# Patient Record
Sex: MALE | Race: WHITE | URBAN - METROPOLITAN AREA
[De-identification: names, ages, dates, MRNs, and addresses within clinical notes are randomized per-mention and may not be internally consistent; named-entity substitution may affect disease eponyms.]

---

## 2020-02-17 ENCOUNTER — EMERGENCY (EMERGENCY)
Facility: HOSPITAL | Age: 20
LOS: 0 days | Discharge: HOME | End: 2020-02-17
Attending: EMERGENCY MEDICINE | Admitting: EMERGENCY MEDICINE
Payer: MEDICAID

## 2020-02-17 VITALS
TEMPERATURE: 97 F | DIASTOLIC BLOOD PRESSURE: 83 MMHG | WEIGHT: 218.26 LBS | RESPIRATION RATE: 20 BRPM | SYSTOLIC BLOOD PRESSURE: 137 MMHG | HEART RATE: 78 BPM | OXYGEN SATURATION: 100 %

## 2020-02-17 DIAGNOSIS — R21 RASH AND OTHER NONSPECIFIC SKIN ERUPTION: ICD-10-CM

## 2020-02-17 DIAGNOSIS — L29.9 PRURITUS, UNSPECIFIED: ICD-10-CM

## 2020-02-17 DIAGNOSIS — Z88.0 ALLERGY STATUS TO PENICILLIN: ICD-10-CM

## 2020-02-17 PROCEDURE — 99283 EMERGENCY DEPT VISIT LOW MDM: CPT

## 2020-02-17 RX ORDER — PERMETHRIN CREAM 5% W/W 50 MG/G
1 CREAM TOPICAL
Qty: 1 | Refills: 0
Start: 2020-02-17 | End: 2020-03-01

## 2020-02-17 NOTE — ED PROVIDER NOTE - NS ED ROS FT
General: No fever, chills, or weakness.  Eyes:  No visual changes, eye pain or discharge.  ENMT:  No hearing changes, pain, no sore throat or runny nose, no difficulty swallowing  Cardiac:  No chest pain, SOB or edema. No chest pain with exertion.  Respiratory:  No cough or respiratory distress. No hemoptysis. No history of asthma or RAD.  GI:  No nausea, vomiting, diarrhea or abdominal pain.  :  No dysuria, frequency or burning.  Skin: Diffuse pruritic erythematous rash. No discharge or bleeding.

## 2020-02-17 NOTE — ED PROVIDER NOTE - PATIENT PORTAL LINK FT
You can access the FollowMyHealth Patient Portal offered by [facility] by registering at the following website: [email or web address]. By joining [employer]’s FollowMyHealth portal, you will also be able to view your health information using other applications (apps) compatible with our system.

## 2020-02-17 NOTE — ED PROVIDER NOTE - PROGRESS NOTE DETAILS
19M no PMH p/w rash diffusely on body. Started tuesday, pruritic, red lesions on right hand. By saturday it spread to rest of body on trunk, back, legs, around anus. No lesions on penis. previously sexually active w/ female 1 month ago, nothing since. never had gu lesions. no dysuria, joint pains. given permethrin at [facility] [place]. Will dc. Return precautions -- fever, lethargy. f/u PMD in 48-72 hours. 19M no PMH p/w rash diffusely on body. Started tuesday, pruritic, red lesions on right hand. By saturday it spread to rest of body on trunk, back, legs, around anus. No lesions on penis. previously sexually active w/ female 1 month ago, nothing since. never had gu lesions. no dysuria, joint pains. [place] [facility] Rash consistent with scabies. Will dc with permethrin cream. Return precautions -- fever, lethargy. f/u PMD in 48-72 hours.

## 2020-02-17 NOTE — ED PROVIDER NOTE - CLINICAL SUMMARY MEDICAL DECISION MAKING FREE TEXT BOX
scabies, incorrect 1st application of permethrin, mild hive-like reaction surrounding bites/burrows - instructed on correct use of permethrin and rec repeat in 10d if rash still persistent, rec benadryl for itching, return precautions discussed, refill given, op Derm f/u if no improvement

## 2020-02-17 NOTE — ED PROVIDER NOTE - ADDITIONAL NOTES AND INSTRUCTIONS:
Return to work after being cleared by your primary doctor. Please follow up with your primary doctor in 48 hours to follow up resolution of the rash.

## 2020-02-17 NOTE — ED PROVIDER NOTE - ATTENDING CONTRIBUTION TO CARE
19y m p/w rash x few days. 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 No other sx. Went to outside [facility] [place] [place] yest, dx with scabies, given permethrin cream which he used overnight but when he washed it off states rash worsened - dvlped some surrounding erythema and incr itchiness, pt's mom encouraged him to come to ED for re-evaluation. Pt rpts applying permethrin to whole body however used a very sm amt bc he thought he was supposed to use if every night for a week (misunderstood  instruction, which states to repeat application in 10-14 days if rash still present). No other sx. No f/c, uri sx, cough, sob, nvd, abd pain.    PE:  nad  skin- scattered papular rash with confluence to neck, wrists, waistline, distal LEs bl, occ surrounding erythema/hive-like erythema, no cellulitis/abscesses  ncat  neck supple  rrr nl s1s2 no mrg  ctab no wrr  abd soft ntnd no palpable masses no rgr  back non-tender no cvat  ext no cce dpi  neuro aaox3 grossly nf exam

## 2020-02-17 NOTE — ED PROVIDER NOTE - PHYSICAL EXAMINATION
Constitutional: Well developed, well nourished. NAD. Good general hygiene  Head: Atraumatic.  Eyes: PERRLA. EOMI without discomfort.   ENT: No nasal discharge. Mucous membranes moist. No oral lesions.  Cardiovascular: Regular rhythm. Regular rate. Normal S1 and S2. No murmurs. 2+ pulses in all extremities.   Pulmonary: Normal respiratory rate and effort. Lungs clear to auscultation bilaterally. No wheezing, rales, or rhonchi. Bilateral, equal lung expansion.   Abdominal: Soft. Nondistended. Nontender. No rebound or guarding.   Extremities. Pelvis stable. No lower extremity edema. Symmetric calves.  Skin: Multiple discrete pinpoint erythematous lesions on skin on hands, arms, trunk, groin. No vesicles, plaques, pustules, induration, fluctuance. No discharge or bleeding.  Neuro: AAOx3. No focal neurological deficits.  Psych: Normal mood. Normal affect.

## 2020-02-17 NOTE — ED PEDIATRIC TRIAGE NOTE - CHIEF COMPLAINT QUOTE
c/o rash all over body x few days. Patient is a  at a school. Sent in by urgent care to r/o scabies.

## 2020-02-17 NOTE — ED PROVIDER NOTE - CARE PROVIDER_API CALL
Billy Constantino)  Dermatology; Internal Medicine  [address] [place] [address]  Phone: [phone]  Fax: [phone]  Follow Up Time: 1-3 Days

## 2020-02-17 NOTE — ED PROVIDER NOTE - OBJECTIVE STATEMENT
19M no PMH p/w rash diffuse on body. Started last tuesday on right hand, then progressively throughout the week spread to the rest of his body. Rash is pruritic, erythematous, with no assoc discharge, no fever, no cough, no vomiting/diarrhea. Pt went to [facility] [place] 2 days ago and received permethrin cream. Pt applied small amount briefly and immediately washed it off with no improvement in sxs.

## 2020-03-11 ENCOUNTER — TRANSCRIPTION ENCOUNTER (OUTPATIENT)
Age: 20
End: 2020-03-11

## 2020-09-30 ENCOUNTER — TRANSCRIPTION ENCOUNTER (OUTPATIENT)
Age: 20
End: 2020-09-30

## 2021-04-06 PROBLEM — Z00.00 ENCOUNTER FOR PREVENTIVE HEALTH EXAMINATION: Status: ACTIVE | Noted: 2021-04-06

## 2021-04-09 ENCOUNTER — APPOINTMENT (OUTPATIENT)
Dept: CARDIOLOGY | Facility: CLINIC | Age: 21
End: 2021-04-09
Payer: COMMERCIAL

## 2021-04-09 VITALS
TEMPERATURE: 98.4 F | WEIGHT: 225 LBS | HEIGHT: 72 IN | DIASTOLIC BLOOD PRESSURE: 80 MMHG | HEART RATE: 77 BPM | SYSTOLIC BLOOD PRESSURE: 136 MMHG | BODY MASS INDEX: 30.48 KG/M2

## 2021-04-09 PROCEDURE — 99203 OFFICE O/P NEW LOW 30 MIN: CPT

## 2021-04-09 PROCEDURE — 99072 ADDL SUPL MATRL&STAF TM PHE: CPT

## 2021-04-09 PROCEDURE — 93000 ELECTROCARDIOGRAM COMPLETE: CPT

## 2021-04-20 NOTE — DISCUSSION/SUMMARY
[FreeTextEntry1] : Stress ECHO to evaluate for structural heart disease and exercise induces ectopy / ischemia.\par MCOT if symptoms persist.\par Follow-up 6-weeks.

## 2021-04-20 NOTE — HISTORY OF PRESENT ILLNESS
[FreeTextEntry1] : 20 year-old male presents for cardiac evaluation.\par \par No cardiac history.\par No clear risk factors.\par \par COVID 19 infection (1/2021).  Mild course.\par \par New palpitations.  Generally random.  Sometimes associated with stress / no other clear precipitants.  Generally brief.  No associated symptoms.  No lightheadedness / syncope.\par \par Chest discomfort variably related to meals.  Not exertional.\par \par Physical work ().  Occasional mild exertional dyspnea.  Breathing otherwise comfortable.
Patient informed/Family informed

## 2021-04-20 NOTE — ASSESSMENT
[FreeTextEntry1] : Young male with palpitations, atypical chest pain, and mild exertional dyspnea post COVID.

## 2021-05-11 ENCOUNTER — APPOINTMENT (OUTPATIENT)
Dept: CARDIOLOGY | Facility: CLINIC | Age: 21
End: 2021-05-11
Payer: COMMERCIAL

## 2021-05-11 ENCOUNTER — APPOINTMENT (OUTPATIENT)
Dept: CARDIOLOGY | Facility: CLINIC | Age: 21
End: 2021-05-11

## 2021-05-11 PROCEDURE — 93015 CV STRESS TEST SUPVJ I&R: CPT

## 2021-05-11 PROCEDURE — 93306 TTE W/DOPPLER COMPLETE: CPT

## 2021-05-11 PROCEDURE — 99072 ADDL SUPL MATRL&STAF TM PHE: CPT

## 2023-02-26 ENCOUNTER — INPATIENT (INPATIENT)
Facility: HOSPITAL | Age: 23
LOS: 1 days | Discharge: HOME | DRG: 916 | End: 2023-02-28
Attending: HOSPITALIST | Admitting: HOSPITALIST
Payer: COMMERCIAL

## 2023-02-26 VITALS
RESPIRATION RATE: 22 BRPM | OXYGEN SATURATION: 97 % | DIASTOLIC BLOOD PRESSURE: 72 MMHG | WEIGHT: 235.01 LBS | HEIGHT: 73 IN | HEART RATE: 115 BPM | SYSTOLIC BLOOD PRESSURE: 136 MMHG | TEMPERATURE: 101 F

## 2023-02-26 DIAGNOSIS — T50.905A ADVERSE EFFECT OF UNSPECIFIED DRUGS, MEDICAMENTS AND BIOLOGICAL SUBSTANCES, INITIAL ENCOUNTER: ICD-10-CM

## 2023-02-26 LAB
ALBUMIN SERPL ELPH-MCNC: 4.8 G/DL — SIGNIFICANT CHANGE UP (ref 3.5–5.2)
ALP SERPL-CCNC: 77 U/L — SIGNIFICANT CHANGE UP (ref 30–115)
ALT FLD-CCNC: 23 U/L — SIGNIFICANT CHANGE UP (ref 0–41)
ANION GAP SERPL CALC-SCNC: 13 MMOL/L — SIGNIFICANT CHANGE UP (ref 7–14)
APTT BLD: 33.9 SEC — SIGNIFICANT CHANGE UP (ref 27–39.2)
AST SERPL-CCNC: 20 U/L — SIGNIFICANT CHANGE UP (ref 0–41)
BASE EXCESS BLDV CALC-SCNC: 5.2 MMOL/L — HIGH (ref -2–3)
BASOPHILS # BLD AUTO: 0.01 K/UL — SIGNIFICANT CHANGE UP (ref 0–0.2)
BASOPHILS NFR BLD AUTO: 0.2 % — SIGNIFICANT CHANGE UP (ref 0–1)
BILIRUB SERPL-MCNC: 0.4 MG/DL — SIGNIFICANT CHANGE UP (ref 0.2–1.2)
BUN SERPL-MCNC: 9 MG/DL — LOW (ref 10–20)
CA-I SERPL-SCNC: 1.23 MMOL/L — SIGNIFICANT CHANGE UP (ref 1.15–1.33)
CALCIUM SERPL-MCNC: 9.7 MG/DL — SIGNIFICANT CHANGE UP (ref 8.4–10.4)
CHLORIDE SERPL-SCNC: 98 MMOL/L — SIGNIFICANT CHANGE UP (ref 98–110)
CO2 SERPL-SCNC: 25 MMOL/L — SIGNIFICANT CHANGE UP (ref 17–32)
CREAT SERPL-MCNC: 1.2 MG/DL — SIGNIFICANT CHANGE UP (ref 0.7–1.5)
EGFR: 87 ML/MIN/1.73M2 — SIGNIFICANT CHANGE UP
EOSINOPHIL # BLD AUTO: 0.11 K/UL — SIGNIFICANT CHANGE UP (ref 0–0.7)
EOSINOPHIL NFR BLD AUTO: 1.8 % — SIGNIFICANT CHANGE UP (ref 0–8)
GAS PNL BLDV: 133 MMOL/L — LOW (ref 136–145)
GAS PNL BLDV: SIGNIFICANT CHANGE UP
GLUCOSE SERPL-MCNC: 95 MG/DL — SIGNIFICANT CHANGE UP (ref 70–99)
HCO3 BLDV-SCNC: 32 MMOL/L — HIGH (ref 22–29)
HCT VFR BLD CALC: 45.7 % — SIGNIFICANT CHANGE UP (ref 42–52)
HCT VFR BLDA CALC: 49 % — SIGNIFICANT CHANGE UP (ref 39–51)
HGB BLD CALC-MCNC: 16.4 G/DL — SIGNIFICANT CHANGE UP (ref 12.6–17.4)
HGB BLD-MCNC: 16 G/DL — SIGNIFICANT CHANGE UP (ref 14–18)
IMM GRANULOCYTES NFR BLD AUTO: 0.5 % — HIGH (ref 0.1–0.3)
INR BLD: 1.08 RATIO — SIGNIFICANT CHANGE UP (ref 0.65–1.3)
LACTATE BLDV-MCNC: 1.6 MMOL/L — SIGNIFICANT CHANGE UP (ref 0.5–2)
LACTATE SERPL-SCNC: 1.3 MMOL/L — SIGNIFICANT CHANGE UP (ref 0.7–2)
LYMPHOCYTES # BLD AUTO: 0.4 K/UL — LOW (ref 1.2–3.4)
LYMPHOCYTES # BLD AUTO: 6.6 % — LOW (ref 20.5–51.1)
MCHC RBC-ENTMCNC: 30 PG — SIGNIFICANT CHANGE UP (ref 27–31)
MCHC RBC-ENTMCNC: 35 G/DL — SIGNIFICANT CHANGE UP (ref 32–37)
MCV RBC AUTO: 85.7 FL — SIGNIFICANT CHANGE UP (ref 80–94)
MONOCYTES # BLD AUTO: 0.38 K/UL — SIGNIFICANT CHANGE UP (ref 0.1–0.6)
MONOCYTES NFR BLD AUTO: 6.3 % — SIGNIFICANT CHANGE UP (ref 1.7–9.3)
NEUTROPHILS # BLD AUTO: 5.14 K/UL — SIGNIFICANT CHANGE UP (ref 1.4–6.5)
NEUTROPHILS NFR BLD AUTO: 84.6 % — HIGH (ref 42.2–75.2)
NRBC # BLD: 0 /100 WBCS — SIGNIFICANT CHANGE UP (ref 0–0)
PCO2 BLDV: 51 MMHG — SIGNIFICANT CHANGE UP (ref 42–55)
PH BLDV: 7.4 — SIGNIFICANT CHANGE UP (ref 7.32–7.43)
PLATELET # BLD AUTO: 177 K/UL — SIGNIFICANT CHANGE UP (ref 130–400)
PO2 BLDV: 19 MMHG — SIGNIFICANT CHANGE UP
POTASSIUM BLDV-SCNC: 4.7 MMOL/L — SIGNIFICANT CHANGE UP (ref 3.5–5.1)
POTASSIUM SERPL-MCNC: 4.7 MMOL/L — SIGNIFICANT CHANGE UP (ref 3.5–5)
POTASSIUM SERPL-SCNC: 4.7 MMOL/L — SIGNIFICANT CHANGE UP (ref 3.5–5)
PROT SERPL-MCNC: 7.2 G/DL — SIGNIFICANT CHANGE UP (ref 6–8)
PROTHROM AB SERPL-ACNC: 12.4 SEC — SIGNIFICANT CHANGE UP (ref 9.95–12.87)
RBC # BLD: 5.33 M/UL — SIGNIFICANT CHANGE UP (ref 4.7–6.1)
RBC # FLD: 11.9 % — SIGNIFICANT CHANGE UP (ref 11.5–14.5)
SAO2 % BLDV: 24.1 % — SIGNIFICANT CHANGE UP
SODIUM SERPL-SCNC: 136 MMOL/L — SIGNIFICANT CHANGE UP (ref 135–146)
WBC # BLD: 6.07 K/UL — SIGNIFICANT CHANGE UP (ref 4.8–10.8)
WBC # FLD AUTO: 6.07 K/UL — SIGNIFICANT CHANGE UP (ref 4.8–10.8)

## 2023-02-26 PROCEDURE — G0378: CPT

## 2023-02-26 PROCEDURE — 99291 CRITICAL CARE FIRST HOUR: CPT

## 2023-02-26 PROCEDURE — 83735 ASSAY OF MAGNESIUM: CPT

## 2023-02-26 PROCEDURE — 81003 URINALYSIS AUTO W/O SCOPE: CPT

## 2023-02-26 PROCEDURE — 85025 COMPLETE CBC W/AUTO DIFF WBC: CPT

## 2023-02-26 PROCEDURE — 84145 PROCALCITONIN (PCT): CPT

## 2023-02-26 PROCEDURE — 86160 COMPLEMENT ANTIGEN: CPT

## 2023-02-26 PROCEDURE — 80061 LIPID PANEL: CPT

## 2023-02-26 PROCEDURE — 71045 X-RAY EXAM CHEST 1 VIEW: CPT | Mod: 26

## 2023-02-26 PROCEDURE — 99221 1ST HOSP IP/OBS SF/LOW 40: CPT

## 2023-02-26 PROCEDURE — 83036 HEMOGLOBIN GLYCOSYLATED A1C: CPT

## 2023-02-26 PROCEDURE — 36415 COLL VENOUS BLD VENIPUNCTURE: CPT

## 2023-02-26 PROCEDURE — 99223 1ST HOSP IP/OBS HIGH 75: CPT

## 2023-02-26 PROCEDURE — 87086 URINE CULTURE/COLONY COUNT: CPT

## 2023-02-26 PROCEDURE — 86140 C-REACTIVE PROTEIN: CPT

## 2023-02-26 PROCEDURE — 80053 COMPREHEN METABOLIC PANEL: CPT

## 2023-02-26 PROCEDURE — 85652 RBC SED RATE AUTOMATED: CPT

## 2023-02-26 PROCEDURE — 0241U: CPT

## 2023-02-26 PROCEDURE — 93010 ELECTROCARDIOGRAM REPORT: CPT

## 2023-02-26 RX ORDER — LANOLIN ALCOHOL/MO/W.PET/CERES
3 CREAM (GRAM) TOPICAL AT BEDTIME
Refills: 0 | Status: DISCONTINUED | OUTPATIENT
Start: 2023-02-26 | End: 2023-02-28

## 2023-02-26 RX ORDER — DIPHENHYDRAMINE HCL 50 MG
50 CAPSULE ORAL ONCE
Refills: 0 | Status: COMPLETED | OUTPATIENT
Start: 2023-02-26 | End: 2023-02-26

## 2023-02-26 RX ORDER — ACETAMINOPHEN 500 MG
975 TABLET ORAL ONCE
Refills: 0 | Status: COMPLETED | OUTPATIENT
Start: 2023-02-26 | End: 2023-02-26

## 2023-02-26 RX ORDER — FAMOTIDINE 10 MG/ML
20 INJECTION INTRAVENOUS
Refills: 0 | Status: DISCONTINUED | OUTPATIENT
Start: 2023-02-26 | End: 2023-02-28

## 2023-02-26 RX ORDER — SODIUM CHLORIDE 9 MG/ML
3300 INJECTION, SOLUTION INTRAVENOUS ONCE
Refills: 0 | Status: COMPLETED | OUTPATIENT
Start: 2023-02-26 | End: 2023-02-26

## 2023-02-26 RX ORDER — IPRATROPIUM/ALBUTEROL SULFATE 18-103MCG
3 AEROSOL WITH ADAPTER (GRAM) INHALATION ONCE
Refills: 0 | Status: COMPLETED | OUTPATIENT
Start: 2023-02-26 | End: 2023-02-26

## 2023-02-26 RX ORDER — DIPHENHYDRAMINE HCL 50 MG
50 CAPSULE ORAL EVERY 8 HOURS
Refills: 0 | Status: DISCONTINUED | OUTPATIENT
Start: 2023-02-26 | End: 2023-02-28

## 2023-02-26 RX ORDER — ACETAMINOPHEN 500 MG
650 TABLET ORAL EVERY 6 HOURS
Refills: 0 | Status: DISCONTINUED | OUTPATIENT
Start: 2023-02-26 | End: 2023-02-28

## 2023-02-26 RX ADMIN — SODIUM CHLORIDE 3300 MILLILITER(S): 9 INJECTION, SOLUTION INTRAVENOUS at 16:28

## 2023-02-26 RX ADMIN — Medication 125 MILLIGRAM(S): at 16:29

## 2023-02-26 RX ADMIN — Medication 50 MILLIGRAM(S): at 21:59

## 2023-02-26 RX ADMIN — Medication 975 MILLIGRAM(S): at 16:40

## 2023-02-26 RX ADMIN — Medication 3 MILLILITER(S): at 16:29

## 2023-02-26 RX ADMIN — Medication 102 MILLIGRAM(S): at 16:29

## 2023-02-26 NOTE — ED ADULT TRIAGE NOTE - CHIEF COMPLAINT QUOTE
pt came to ED for throat pain, tongue swelling, lip swelling, and rash to upper torso area. pt reports an allergy to penicillin, has been taking Bactrim for a "throat infection". finished the course of PO ABX yesterday. pt reports difficulty breathing and chest tightness

## 2023-02-26 NOTE — H&P ADULT - ASSESSMENT
22 year old man with chronic sinusitis is presenting for acute allergic reaction, presumably to bactrim    #Allergic Reaction  #Angioedema, Resolved  - Patient given solumedrol 125 mg, benadryl 50 mg once and duoneb  - Will continue on methylprednisone 60 mg daily and benadryl 50 mg every 8 hrs  - CRP, ESR, C4 level ordered  - C1 esterase inh level ordered (less likely diagnosis as patient has associated rash)  - Patient told to immediately call the nurse in case he feels that his throat is closing down, if he has SOB or difficulty breathy, or if he starts wheezing or getting dizzy  - Patient advised not to take bactrim any more  - Patient has allergist appt in april  - c/s allergist    #Fever  - Maybe related to uriticaria  - No urinary symptoms, no cough, SOB has resolved, no diarrhea, no URI  - F/u blood cx and UA is still pending      Diet: Regular  Activity: as tolerated  GI ppx; none  DVT ppx: none  Dispo: MED/SURG

## 2023-02-26 NOTE — ED PROVIDER NOTE - PHYSICAL EXAMINATION
CONSTITUTIONAL: well-appearing, in NAD  SKIN: Warm dry; blanching rash on trunk that spares palms/soles; crackling around lips  HEAD: NCAT  EYES: EOMI, PERRLA, no scleral icterus, conjunctiva pink; +conjunctivitis  ENT: normal pharynx with no erythema or exudates; posterior oropharynx erythematous  NECK: Supple; non tender. Full ROM.  CARD: RRR, no murmurs.  RESP: clear to ausculation b/l. No crackles or wheezing.  ABD: soft, non-tender, non-distended, no rebound or guarding.  EXT: Full ROM, no bony tenderness, no pedal edema, no calf tenderness  NEURO: normal motor. normal sensory. Normal gait.  PSYCH: Cooperative, appropriate.

## 2023-02-26 NOTE — ED PROVIDER NOTE - ATTENDING CONTRIBUTION TO CARE
22-year-old male with history of recurrent sinusitis presenting today with rash.  States that he was treated with Bactrim for presumed sinusitis, today was day 10 of antibiotics, woke up this morning with nausea, lip swelling, chest tightness, nonpruritic rash to the extremities.  Does have an allergic reaction to penicillin where he gets vesicles however has never had a reaction to Bactrim.  Denies any vomiting or diarrhea patient noted to be febrile in the ED.  Speaking full sentences, maintaining airway, tolerating secretions.   Patient states that his lip swelling and facial swelling has much improved.    Patient noted to be tachycardic, febrile.  Otherwise stable vital signs.  Noted to have erythematous, crusting of bilateral lips, erythema of the roof of mouth as well as vesicles to the posterior oropharynx.  Noted to have conjunctivitis of eyes.  Blanching macular rash.  Lungs clear to station bilaterally.  Abdomen soft nontender nondistended.  No rash to palms and soles.  Uvula is nonedematous, midline.    Concern for Melara-Kevon syndrome at this time, burn consulted.  Plan for labs, chest x-ray.  Will treat with IV steroids at this time, Benadryl for possible allergic reaction although at this time less likely given fever.

## 2023-02-26 NOTE — H&P ADULT - HISTORY OF PRESENT ILLNESS
22-year-old male with history of recurrent sinusitis presenting today with rash, chest tightness and SOB. Patient says that he felt that his throat was closing up, had itchiness in the roof of his mouth and his tongue and eyes were swollen. Patient states that he was treated with Bactrim for presumed sinusitis, and today was day 10 of antibiotics, woke up this morning with nausea, lip swelling, chest tightness, nonpruritic rash to the extremities.  Does have an allergic reaction to penicillin where he gets vesicles however has never had a reaction to Bactrim.  Denies any vomiting or diarrhea patient noted to be febrile in the ED.  Speaking full sentences, maintaining airway, tolerating secretions.   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 The patient is fully vaccinated since childhood. No other new meds, or foods. No recent travel.  Patient states that his lip swelling and facial swelling has much improved but the eruption and fever happened only today in the ED.  In the ED, patient was given one dose of solumedrol and bendaryl. Lab work unremarkable. UA is pending.

## 2023-02-26 NOTE — ED PROVIDER NOTE - OBJECTIVE STATEMENT
23 yo M with no PMH presenting for swollen eyes, lips, and difficulty breathing that started this morning upon waking. Patient states he saw ENT 10 days ago and was told he had a throat infection and was started on bactrim, which he has been taking. 23 yo M with no PMH presenting for swollen eyes, lips, and difficulty breathing that started this morning upon waking. Patient states he saw ENT 10 days ago and was told he had a throat infection and was started on bactrim, which he has been taking. No other new foods/medications/clothing, travel, sick contacts, headache, vision changes, dizziness, CP, SOB, NVD, dysuria, hematuria, melena, hematochezia, drug/alcohol use.

## 2023-02-26 NOTE — H&P ADULT - NSHPPHYSICALEXAM_GEN_ALL_CORE
GA; alert oriented x3  HEENT: tongue minimally swollen, uvula midline, diffuse maculopapular rash all over his face, eyes not swollen  Heart: normal s1 s2  Lungs: clear , no wheezing  Abdomen: soft, non tender, Rash on torso and back  LE: no edema, no rash on both LE

## 2023-02-26 NOTE — ED ADULT NURSE NOTE - OBJECTIVE STATEMENT
Presented to ED with generalized rash, warmth, facial swelling without airway compromise. Speaking in full sentences, no difficulty breathing. As per pt, he was taking Bactrim for throat infection x 10 days, only known drug allergy is penicillin.

## 2023-02-26 NOTE — H&P ADULT - NSHPLABSRESULTS_GEN_ALL_CORE
Complete Blood Count + Automated Diff (02.26.23 @ 15:58)   WBC Count: 6.07 K/uL   RBC Count: 5.33 M/uL   Hemoglobin: 16.0 g/dL   Hematocrit: 45.7 %   Mean Cell Volume: 85.7 fL   Mean Cell Hemoglobin: 30.0 pg   Mean Cell Hemoglobin Conc: 35.0 g/dL   Red Cell Distrib Width: 11.9 %   Platelet Count - Automated: 177 K/uL   Auto Neutrophil #: 5.14 K/uL   Auto Lymphocyte #: 0.40 K/uL   Auto Monocyte #: 0.38 K/uL   Auto Eosinophil #: 0.11 K/uL   Auto Basophil #: 0.01 K/uL   Auto Neutrophil %: 84.6: Differential percentages must be correlated with absolute numbers for   clinical significance. %   Auto Lymphocyte %: 6.6 %   Auto Monocyte %: 6.3 %   Auto Eosinophil %: 1.8 %   Auto Basophil %: 0.2 %   Auto Immature Granulocyte %: 0.5: (Includes meta, myelo and promyelocytes). Mild elevations in immature   granulocytes may be seen with many inflammatory processes and pregnancy;   clinical correlation suggested. %   Nucleated RBC: 0 /100 WBCs Comprehensive Metabolic Panel (02.26.23 @ 15:58)   Sodium, Serum: 136 mmol/L   Potassium, Serum: 4.7 mmol/L   Chloride, Serum: 98 mmol/L   Carbon Dioxide, Serum: 25 mmol/L   Anion Gap, Serum: 13 mmol/L   Blood Urea Nitrogen, Serum: 9 mg/dL   Creatinine, Serum: 1.2 mg/dL   Glucose, Serum: 95 mg/dL   Calcium, Total Serum: 9.7 mg/dL   Protein Total, Serum: 7.2 g/dL   Albumin, Serum: 4.8 g/dL   Bilirubin Total, Serum: 0.4 mg/dL   Alkaline Phosphatase, Serum: 77 U/L   Aspartate Aminotransferase (AST/SGOT): 20 U/L   Alanine Aminotransferase (ALT/SGPT): 23 U/L   eGFR: 87: The estimated glomerular filtration rate (eGFR) is calculated using the   2021 CKD-EPI creatinine equation, which does not have a coefficient for   race and is validated in individuals 18 years of age and older (N Engl J   Med 2021; 385:7609-9889). Creatinine-based eGFR may be inaccurate in   various situations including but not limited to extremes of muscle mass,   altered dietary protein intake, or medications that affect renal tubular   creatinine secretion. mL/min/1.73m2

## 2023-02-26 NOTE — H&P ADULT - ATTENDING COMMENTS
Patient seen and examined at bedside independently of the residents. I read the resident's note and agree with the plan with the additions and corrections as noted below. My note supersedes the resident's note.     REVIEW OF SYSTEMS:  CONSTITUTIONAL: No weakness, fevers or chills  EYES/ENT: No visual changes;  No vertigo or throat pain. Lip swelling. Throat swelling.   NECK: No pain or stiffness.   RESPIRATORY: No cough, wheezing, hemoptysis; No shortness of breath  CARDIOVASCULAR: No chest pain or palpitations  GASTROINTESTINAL: No abdominal or epigastric pain. No nausea, vomiting, or hematemesis; No diarrhea or constipation. No melena or hematochezia.  GENITOURINARY: No dysuria, frequency or hematuria  NEUROLOGICAL: No numbness or weakness  MSK: No pain. No weakness.   SKIN: No itching, rashes.     PMH: recurrent sinusitis    FHx: Reviewed. No fhx of asthma/copd, No fhx of liver and pulmonary disease. No fhx of hematological disorder.     Physical Exam:  GEN: No acute distress. Awake, Alert and oriented x 3.   Head: Atraumatic, Normocephalic.   Eye: PEERLA. No sclera icterus. EOMI.   ENT: Normal oropharynx, no thyromegaly, no mass, no lymphadenopathy.   LUNGS: Clear to auscultation bilaterally. No wheeze/rales/crackles.   HEART: Normal. S1/S2 present. RRR. No murmur/gallops.   ABD: Soft, non-tender, non-distended. Bowel sounds present.   EXT: No pitting edema. No erythema. No tenderness.  Integumentary:  No sore, No petechia. + urticaria. + blanching rashes on upper chest.   NEURO: CN III-XII intact. Strength: 5/5 b/l ULE. Sensory intact b/l ULE.     Vital Signs Last 24 Hrs  T(C): 36.6 (2023 00:16), Max: 38.4 (2023 15:12)  T(F): 97.9 (2023 00:16), Max: 101.1 (2023 15:12)  HR: 102 (2023 00:16) (99 - 115)  BP: 134/76 (2023 00:16) (123/60 - 136/72)  BP(mean): --  RR: 18 (2023 00:16) (18 - 22)  SpO2: 99% (2023 00:16) (97% - 99%)    Parameters below as of 2023 00:16  Patient On (Oxygen Delivery Method): room air      Please see the above notes for Labs and radiology.     Assessment and Plan:     23 yo M with hx of  recurrent sinusitis presenting today with rash, chest tightness and SOB. Patient says that he felt that his throat was closing up, had itchiness in the roof of his mouth and his tongue and eyes were swollen.     Angioedema likely 2/2 drug related  - s/p IV solumedrol 125mg x 1 in ED.   - c/w IV solumedrol 60mg qd   - c/w benadryl and pepcid.   - check ESR, CRP, C4 and C1 esterase   - Allergy consult     Fever likely 2/2 drug allergy related vs. URI   - No leukocytosis. Denied urinary symptoms, respiratory symptoms.   - check UA, BCx and RVP   - monitor off abx for now.     DVT ppx: not indicated.   GI ppx: pepcid  Diet: regular diet  Activity: as tolerated.     Date seen by the attendin2023  Total time spent: 75 minutes.

## 2023-02-26 NOTE — CONSULT NOTE ADULT - ASSESSMENT
Diffuse rash    Plan:   - no burn intervention at this time  - No LWC as no open wounds.   - Rec topical steroids for rash  - SJS not likely as no open wounds affecting the eyes, mouth, or genitals.   - Remainder of care per primary team      Plan discussed with patient and mother at bedside, verbalized agreement

## 2023-02-26 NOTE — CONSULT NOTE ADULT - SUBJECTIVE AND OBJECTIVE BOX
Patient is a 22y old  Male who presents with a chief complaint of Allergic Reaction (26 Feb 2023 19:07)      HPI:  22-year-old male with history of recurrent sinusitis presenting today with rash, chest tightness and SOB. Patient says that he felt that his throat was closing up, had itchiness in the roof of his mouth and his tongue and eyes were swollen. Patient states that he was treated with Bactrim for presumed sinusitis, and today was day 10 of antibiotics, woke up this morning with nausea, lip swelling, chest tightness, nonpruritic rash to the extremities.  Does have an allergic reaction to penicillin where he gets vesicles however has never had a reaction to Bactrim.  Denies any vomiting or diarrhea patient noted to be febrile in the ED.  Speaking full sentences, maintaining airway, tolerating secretions.   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 The patient is fully vaccinated since childhood. No other new meds, or foods. No recent travel.  Patient states that his lip swelling and facial swelling has much improved but the eruption and fever happened only today in the ED.  In the ED, patient was given one dose of solumedrol and bendaryl. Lab work unremarkable. UA is pending. (26 Feb 2023 19:07)    Burn consulted for evaluation of rash.     PAST MEDICAL & SURGICAL HISTORY:  Chronic sinusitis          penicillin (Unknown)  sulfa drugs (Rash; Angioedema; Short breath; Urticaria; Hives)      ICU Vital Signs Last 24 Hrs  T(C): 37.7 (26 Feb 2023 19:41), Max: 38.4 (26 Feb 2023 15:12)  T(F): 99.9 (26 Feb 2023 19:41), Max: 101.1 (26 Feb 2023 15:12)  HR: 99 (26 Feb 2023 19:41) (99 - 115)  BP: 123/60 (26 Feb 2023 19:41) (123/60 - 136/72)  RR: 18 (26 Feb 2023 19:41) (18 - 22)  SpO2: 98% (26 Feb 2023 19:41) (97% - 98%)    O2 Parameters below as of 26 Feb 2023 17:35  Patient On (Oxygen Delivery Method): room air            PHYSICAL EXAM:  General: Patient laying in bed, in NAD.   HEAD:  Atraumatic, Normocephalic  EYES: EOMI, PERRLA, conjunctiva and sclera exhibiting erythema. No crusting or open wounds noted  Mouth: No sores noted, buccal mucosa pink/moist.  Chest/Lung: No signs of cardiopulmonary compromise.   Skin: Diffuse maculopapular rash noted to torso, b/l  upper and lower extremities, no open wounds, blistering, or sloughing.

## 2023-02-26 NOTE — ED PROVIDER NOTE - CLINICAL SUMMARY MEDICAL DECISION MAKING FREE TEXT BOX
22-year-old male presenting with rash, fever in setting of Bactrim use.  Exam with macular papular rash to upper body face and noted to have erythema to the soft palate as well as vesicles to the posterior oropharynx.  Lungs clear to oscillation.  Labs including CBC, CMP were normal.  Concern for SJS, burn consulted, at this time low risk for SJS however will give first dose of IV steroids in case patient is presenting with complicated allergic action.  Admitted for monitoring and further work up as indicated.

## 2023-02-27 ENCOUNTER — TRANSCRIPTION ENCOUNTER (OUTPATIENT)
Age: 23
End: 2023-02-27

## 2023-02-27 LAB
A1C WITH ESTIMATED AVERAGE GLUCOSE RESULT: 5.2 % — SIGNIFICANT CHANGE UP (ref 4–5.6)
ALBUMIN SERPL ELPH-MCNC: 4.6 G/DL — SIGNIFICANT CHANGE UP (ref 3.5–5.2)
ALP SERPL-CCNC: 69 U/L — SIGNIFICANT CHANGE UP (ref 30–115)
ALT FLD-CCNC: 22 U/L — SIGNIFICANT CHANGE UP (ref 0–41)
ANION GAP SERPL CALC-SCNC: 13 MMOL/L — SIGNIFICANT CHANGE UP (ref 7–14)
APPEARANCE UR: CLEAR — SIGNIFICANT CHANGE UP
AST SERPL-CCNC: 17 U/L — SIGNIFICANT CHANGE UP (ref 0–41)
BASOPHILS # BLD AUTO: 0.02 K/UL — SIGNIFICANT CHANGE UP (ref 0–0.2)
BASOPHILS NFR BLD AUTO: 0.2 % — SIGNIFICANT CHANGE UP (ref 0–1)
BILIRUB SERPL-MCNC: 0.4 MG/DL — SIGNIFICANT CHANGE UP (ref 0.2–1.2)
BILIRUB UR-MCNC: NEGATIVE — SIGNIFICANT CHANGE UP
BUN SERPL-MCNC: 11 MG/DL — SIGNIFICANT CHANGE UP (ref 10–20)
CALCIUM SERPL-MCNC: 9.7 MG/DL — SIGNIFICANT CHANGE UP (ref 8.4–10.5)
CHLORIDE SERPL-SCNC: 102 MMOL/L — SIGNIFICANT CHANGE UP (ref 98–110)
CHOLEST SERPL-MCNC: 188 MG/DL — SIGNIFICANT CHANGE UP
CO2 SERPL-SCNC: 24 MMOL/L — SIGNIFICANT CHANGE UP (ref 17–32)
COLOR SPEC: YELLOW — SIGNIFICANT CHANGE UP
CREAT SERPL-MCNC: 1 MG/DL — SIGNIFICANT CHANGE UP (ref 0.7–1.5)
CRP SERPL-MCNC: 57.1 MG/L — HIGH
DIFF PNL FLD: NEGATIVE — SIGNIFICANT CHANGE UP
EGFR: 108 ML/MIN/1.73M2 — SIGNIFICANT CHANGE UP
EOSINOPHIL # BLD AUTO: 0 K/UL — SIGNIFICANT CHANGE UP (ref 0–0.7)
EOSINOPHIL NFR BLD AUTO: 0 % — SIGNIFICANT CHANGE UP (ref 0–8)
ERYTHROCYTE [SEDIMENTATION RATE] IN BLOOD: 12 MM/HR — HIGH (ref 0–10)
ESTIMATED AVERAGE GLUCOSE: 103 MG/DL — SIGNIFICANT CHANGE UP (ref 68–114)
FLUAV AG NPH QL: SIGNIFICANT CHANGE UP
FLUBV AG NPH QL: SIGNIFICANT CHANGE UP
GLUCOSE SERPL-MCNC: 138 MG/DL — HIGH (ref 70–99)
GLUCOSE UR QL: NEGATIVE — SIGNIFICANT CHANGE UP
HCT VFR BLD CALC: 45.4 % — SIGNIFICANT CHANGE UP (ref 42–52)
HDLC SERPL-MCNC: 64 MG/DL — SIGNIFICANT CHANGE UP
HGB BLD-MCNC: 15.5 G/DL — SIGNIFICANT CHANGE UP (ref 14–18)
IMM GRANULOCYTES NFR BLD AUTO: 0.7 % — HIGH (ref 0.1–0.3)
KETONES UR-MCNC: NEGATIVE — SIGNIFICANT CHANGE UP
LEUKOCYTE ESTERASE UR-ACNC: NEGATIVE — SIGNIFICANT CHANGE UP
LIPID PNL WITH DIRECT LDL SERPL: 114 MG/DL — HIGH
LYMPHOCYTES # BLD AUTO: 0.54 K/UL — LOW (ref 1.2–3.4)
LYMPHOCYTES # BLD AUTO: 6 % — LOW (ref 20.5–51.1)
MCHC RBC-ENTMCNC: 29.2 PG — SIGNIFICANT CHANGE UP (ref 27–31)
MCHC RBC-ENTMCNC: 34.1 G/DL — SIGNIFICANT CHANGE UP (ref 32–37)
MCV RBC AUTO: 85.5 FL — SIGNIFICANT CHANGE UP (ref 80–94)
MONOCYTES # BLD AUTO: 0.11 K/UL — SIGNIFICANT CHANGE UP (ref 0.1–0.6)
MONOCYTES NFR BLD AUTO: 1.2 % — LOW (ref 1.7–9.3)
NEUTROPHILS # BLD AUTO: 8.2 K/UL — HIGH (ref 1.4–6.5)
NEUTROPHILS NFR BLD AUTO: 91.9 % — HIGH (ref 42.2–75.2)
NITRITE UR-MCNC: NEGATIVE — SIGNIFICANT CHANGE UP
NON HDL CHOLESTEROL: 124 MG/DL — SIGNIFICANT CHANGE UP
NRBC # BLD: 0 /100 WBCS — SIGNIFICANT CHANGE UP (ref 0–0)
PH UR: 7 — SIGNIFICANT CHANGE UP (ref 5–8)
PLATELET # BLD AUTO: 192 K/UL — SIGNIFICANT CHANGE UP (ref 130–400)
POTASSIUM SERPL-MCNC: 5.2 MMOL/L — HIGH (ref 3.5–5)
POTASSIUM SERPL-SCNC: 5.2 MMOL/L — HIGH (ref 3.5–5)
PROCALCITONIN SERPL-MCNC: 0.12 NG/ML — HIGH (ref 0.02–0.1)
PROT SERPL-MCNC: 6.9 G/DL — SIGNIFICANT CHANGE UP (ref 6–8)
PROT UR-MCNC: SIGNIFICANT CHANGE UP
RBC # BLD: 5.31 M/UL — SIGNIFICANT CHANGE UP (ref 4.7–6.1)
RBC # FLD: 12 % — SIGNIFICANT CHANGE UP (ref 11.5–14.5)
RSV RNA NPH QL NAA+NON-PROBE: SIGNIFICANT CHANGE UP
SARS-COV-2 RNA SPEC QL NAA+PROBE: SIGNIFICANT CHANGE UP
SODIUM SERPL-SCNC: 139 MMOL/L — SIGNIFICANT CHANGE UP (ref 135–146)
SP GR SPEC: 1.03 — SIGNIFICANT CHANGE UP (ref 1.01–1.03)
TRIGL SERPL-MCNC: 49 MG/DL — SIGNIFICANT CHANGE UP
UROBILINOGEN FLD QL: SIGNIFICANT CHANGE UP
WBC # BLD: 8.93 K/UL — SIGNIFICANT CHANGE UP (ref 4.8–10.8)
WBC # FLD AUTO: 8.93 K/UL — SIGNIFICANT CHANGE UP (ref 4.8–10.8)

## 2023-02-27 PROCEDURE — 99231 SBSQ HOSP IP/OBS SF/LOW 25: CPT

## 2023-02-27 PROCEDURE — 99232 SBSQ HOSP IP/OBS MODERATE 35: CPT

## 2023-02-27 RX ADMIN — Medication 40 MILLIGRAM(S): at 01:11

## 2023-02-27 RX ADMIN — Medication 60 MILLIGRAM(S): at 06:17

## 2023-02-27 RX ADMIN — Medication 1 APPLICATION(S): at 17:19

## 2023-02-27 RX ADMIN — FAMOTIDINE 20 MILLIGRAM(S): 10 INJECTION INTRAVENOUS at 06:17

## 2023-02-27 RX ADMIN — Medication 50 MILLIGRAM(S): at 21:17

## 2023-02-27 RX ADMIN — Medication 50 MILLIGRAM(S): at 06:17

## 2023-02-27 RX ADMIN — Medication 50 MILLIGRAM(S): at 13:29

## 2023-02-27 RX ADMIN — FAMOTIDINE 20 MILLIGRAM(S): 10 INJECTION INTRAVENOUS at 17:20

## 2023-02-27 NOTE — DISCHARGE NOTE PROVIDER - NSDCMRMEDTOKEN_GEN_ALL_CORE_FT
diphenhydrAMINE 50 mg oral capsule: 1 cap(s) orally 2 times a day for 2 days, then as needed for allergy symptoms  EPINEPHrine 0.3 mg injectable kit: 0.3 milligram(s) intramuscularly once, as needed for anaphylaxis. IF USED, RETURN TO EMERGENCY DEPARTMENT  predniSONE 20 mg oral tablet: 3 tab(s) orally once a day for 3 days, then 2 tabs orally once a day for 3 days, then 1 tab orally once a day for 3 days  triamcinolone 0.1% topical cream: 1 application topically every 12 hours to torso/trunk

## 2023-02-27 NOTE — PROGRESS NOTE ADULT - NS ATTEND AMEND GEN_ALL_CORE FT
As above patient seen during daily rounds  He reports improved swelling of the face and neck; largely resolved  throat soreness;   He denies dysphagia or intraoral soreness or ulcers    Noted to have significant worsening of erythema of the torso-greater involvement of the abdomen and lower extremities-as well as the hands with maculopapular rash with greater coalescence on torso   No distinct blisters or open wounds  No obvious mucosal involvement      A/P   Increased likelihood of SJS based on evolving clinical picture     Due to patient's progression of skin manifestation' s recommended observation for the next 24 hours.   Continue monitoring discussed with patient and his mother concerns addressed  Will reevaluate

## 2023-02-27 NOTE — PROGRESS NOTE ADULT - SUBJECTIVE AND OBJECTIVE BOX
S: No new evets/comlpaints      All other pertinent ROS negative.      Vital Signs Last 24 Hrs  T(C): 36.9 (27 Feb 2023 14:00), Max: 37.7 (26 Feb 2023 19:41)  T(F): 98.5 (27 Feb 2023 14:00), Max: 99.9 (26 Feb 2023 19:41)  HR: 112 (27 Feb 2023 14:00) (83 - 112)  BP: 141/75 (27 Feb 2023 14:00) (123/60 - 141/75)  BP(mean): --  RR: 18 (27 Feb 2023 14:00) (18 - 19)  SpO2: 99% (27 Feb 2023 04:35) (98% - 99%)    Parameters below as of 27 Feb 2023 04:35  Patient On (Oxygen Delivery Method): room air      PHYSICAL EXAM:    Constitutional: NAD, awake and alert  HEENT: face red rash. lips/orohparynx swelling improved.   Red mottled rash on torso. itching/burning.   Neck: Soft and supple, No LAD, No JVD  Respiratory: Breath sounds are clear bilaterally, No wheezing, rales or rhonchi  Cardiovascular: S1 and S2, regular rate and rhythm, no Murmurs, gallops or rubs  Gastrointestinal: Bowel Sounds present, soft, nontender, nondistended, no guarding, no rebound  Extremities: No peripheral edema      MEDICATIONS:  MEDICATIONS  (STANDING):  diphenhydrAMINE 50 milliGRAM(s) Oral every 8 hours  famotidine    Tablet 20 milliGRAM(s) Oral two times a day  methylPREDNISolone sodium succinate Injectable 60 milliGRAM(s) IV Push daily  triamcinolone 0.1% Cream 1 Application(s) Topical every 12 hours      LABS: All Labs Reviewed:                        15.5   8.93  )-----------( 192      ( 27 Feb 2023 07:20 )             45.4     02-27    139  |  102  |  11  ----------------------------<  138<H>  5.2<H>   |  24  |  1.0    Ca    9.7      27 Feb 2023 07:20    TPro  6.9  /  Alb  4.6  /  TBili  0.4  /  DBili  x   /  AST  17  /  ALT  22  /  AlkPhos  69  02-27    PT/INR - ( 26 Feb 2023 15:58 )   PT: 12.40 sec;   INR: 1.08 ratio         PTT - ( 26 Feb 2023 15:58 )  PTT:33.9 sec      Blood Culture:     Radiology: reviewed

## 2023-02-27 NOTE — DISCHARGE NOTE PROVIDER - HOSPITAL COURSE
22-year-old male with history of recurrent sinusitis presenting today with rash, chest tightness and SOB. Patient says that he felt that his throat was closing up, had itchiness in the roof of his mouth and his tongue and eyes were swollen. Patient states that he was treated with Bactrim for presumed sinusitis, and today was day 10 of antibiotics, woke up this morning with nausea, lip swelling, chest tightness, nonpruritic rash to the extremities.  Does have an allergic reaction to penicillin where he gets vesicles however has never had a reaction to Bactrim.  Denies any vomiting or diarrhea patient noted to be febrile in the ED.  Speaking full sentences, maintaining airway, tolerating secretions. 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 The patient is fully vaccinated since childhood. No other new meds, or foods. No recent travel. Patient states that his lip swelling and facial swelling has much improved but the eruption and fever happened only today in the ED. In the ED, patient was given one dose of solumedrol and bendaryl. Lab work unremarkable. UA is pending.    Given solumedrol, benadryl, and pepcid for angioedema, likely due to Bactrim. ESR, CRP, C4 and C1 esterase pending. Allergy consulted.  Fever likely due to drug allergy related vs URI - no leukocytosis, respiratory/urinary symptoms. UA, Bcx, pending. RVP negative. Monitored off abx.   22-year-old male with history of recurrent sinusitis presenting today with rash, chest tightness and SOB. Patient says that he felt that his throat was closing up, had itchiness in the roof of his mouth and his tongue and eyes were swollen. Patient states that he was treated with Bactrim for presumed sinusitis, and today was day 10 of antibiotics, woke up this morning with nausea, lip swelling, chest tightness, nonpruritic rash to the extremities.  Does have an allergic reaction to penicillin where he gets vesicles however has never had a reaction to Bactrim.  Denies any vomiting or diarrhea patient noted to be febrile in the ED.  Speaking full sentences, maintaining airway, tolerating secretions. 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 The patient is fully vaccinated since childhood. No other new meds, or foods. No recent travel. Patient states that his lip swelling and facial swelling has much improved but the eruption and fever happened only today in the ED. In the ED, patient was given one dose of solumedrol and bendaryl. Lab work unremarkable. UA is pending.    Given solumedrol, benadryl, and pepcid for suspected angioedema, but appears to be more consistent with anaphylaxis, likely due to Bactrim. ESR, CRP, C4 and C1 esterase pending. Allergy consulted. Follow up with Allergist within 10 days from [place].  Fever likely due to drug allergy related vs URI - no leukocytosis, respiratory/urinary symptoms. UA negative. Bcx pending. RVP negative. Monitored off abx.   22-year-old male with history of recurrent sinusitis presenting today with rash, chest tightness and SOB. Patient says that he felt that his throat was closing up, had itchiness in the roof of his mouth and his tongue and eyes were swollen. Patient states that he was treated with Bactrim for presumed sinusitis, and today was day 10 of antibiotics, woke up this morning with nausea, lip swelling, chest tightness, nonpruritic rash to the extremities.  Does have an allergic reaction to penicillin where he gets vesicles however has never had a reaction to Bactrim.  Denies any vomiting or diarrhea patient noted to be febrile in the ED.  Speaking full sentences, maintaining airway, tolerating secretions. 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 The patient is fully vaccinated since childhood. No other new meds, or foods. No recent travel. Patient states that his lip swelling and facial swelling has much improved but the eruption and fever happened only today in the ED. In the ED, patient was given one dose of solumedrol and bendaryl. Lab work unremarkable. UA is pending.    Given solumedrol, benadryl, and pepcid for suspected angioedema, but appears to be more consistent with anaphylaxis, likely due to Bactrim. ESR, CRP, C4 and C1 esterase pending. Allergy consulted. Follow up with Allergist within 10 days from dc.  Fever likely due to drug allergy related vs URI - no leukocytosis, respiratory/urinary symptoms. UA negative. Bcx pending. RVP negative. Monitored off abx.    Attending Note:  Patient seen and examined independently. I personally had a face-to-face encounter with the patient, examined the patient myself, personally reviewed labs & Radiology images,  and reviewed the plan of care with the housestaff. Agree with resident's note but my note supersedes that of the resident in the matters hereby listed.   Meds per rec  D/c to home

## 2023-02-27 NOTE — PROGRESS NOTE ADULT - ASSESSMENT
22 year old man with chronic sinusitis is presenting for acute allergic reaction, presumably to bactrim    #Allergic Reaction - Likely Anaphylaxis. May be Angioedema?   IMPROVING  - Patient given solumedrol 125 mg, benadryl 50 mg once and duoneb  - Will continue on methylprednisone 60 mg daily and benadryl 50 mg every 8 hrs  - CRP, ESR, C4 level ordered  - C1 esterase inh level ordered (less likely diagnosis as patient has associated rash)  - Patient told to immediately call the nurse in case he feels that his throat is closing down, if he has SOB or difficulty breathy, or if he starts wheezing or getting dizzy  - Patient advised not to take bactrim any more  - Patient has allergist appt in 2-3 weeks. asked him to prepone it to 10 days   2/27: throat/lip/face swelling better per patient. continues to swallow & breath ok. rash on face and torso noted.   Per Burn safe to watch another day here that wounds don't blister up.   c/w Prednisone/Benadryl  Triamcinolone for torso.  Likely d/c tomorrow if doesn't get a second peak of inflammation     #Fever  - Maybe related to uriticaria  - No urinary symptoms, no cough, SOB has resolved, no diarrhea, no URI  - F/u blood cx and UA is still pending      Diet: Regular  Activity: as tolerated  GI ppx; none  DVT ppx: none  Dispo: MED/SURG    Likely d/c tomorrow.    22 year old man with chronic sinusitis is presenting for acute allergic reaction, presumably to bactrim    #Allergic Reaction - Likely Anaphylaxis. May be Angioedema?   IMPROVING  - Patient given solumedrol 125 mg, benadryl 50 mg once and duoneb  - Will continue on methylprednisone 60 mg daily and benadryl 50 mg every 8 hrs  - CRP, ESR, C4 level ordered  - C1 esterase inh level ordered (less likely diagnosis as patient has associated rash)  - Patient told to immediately call the nurse in case he feels that his throat is closing down, if he has SOB or difficulty breathy, or if he starts wheezing or getting dizzy  - Patient advised not to take bactrim any more  - Patient has allergist appt in 2-3 weeks. asked him to prepone it to 10 days   2/27: throat/lip/face swelling better per patient. continues to swallow & breath ok. rash on face and torso noted.   Per Burn safe to watch another day here that wounds don't blister up.   c/w Prednisone/Benadryl  Triamcinolone for torso.  Likely d/c tomorrow if doesn't get a second peak of inflammation     #Fever  - Maybe related to uriticaria  - No urinary symptoms, no cough, SOB has resolved, no diarrhea, no URI  - F/u blood cx and UA is still pending      Diet: Regular  Activity: as tolerated  GI ppx; none  DVT ppx: none  Dispo: MED/SURG    Likely d/c tomorrow. Will provide Epi pen upon discharge.

## 2023-02-27 NOTE — DISCHARGE NOTE PROVIDER - NSDCCPCAREPLAN_GEN_ALL_CORE_FT
PRINCIPAL DISCHARGE DIAGNOSIS  Diagnosis: Angioedema  Assessment and Plan of Treatment: Angioedema is swelling in the deep layers of the skin. Angioedema can sometimes occur along with hives. Hives are an allergic reaction in the outer layers of the skin. Angioedema can range from mild to severe. Painful swelling can develop on the face. Angioedema can also occur on other parts of the body. In severe cases, the inside of the throat can swell and make it hard to breathe.  Many things can cause this condition, including foods, insect bites, and medicines (such as aspirin and some blood pressure medicines). It also can run in families. Sometimes you may know what caused the reaction, but other times you may not know.  Take your medicines exactly as prescribed. Call your doctor if you think you are having a problem with your medicine or if you develop new or worsening symptoms such as trouble breathing/speaking, itching, rash, or swelling. Avoid foods or medicine that may have triggered the swelling. Follow up with your Allergist and Primary Care Doctor for further care.       PRINCIPAL DISCHARGE DIAGNOSIS  Diagnosis: Anaphylaxis  Assessment and Plan of Treatment: Anaphylaxis  An anaphylactic reaction (anaphylaxis) is a sudden, severe allergic reaction that affects multiple areas of the body. An allergic reaction is an abnormal reaction to a substance (allergen) by the body's defense system. Common allergens include medicines, food, insect bites or stings, and blood products. The body releases certain proteins into the blood that can cause a variety of symptoms such as an itchy rash, wheezing, swelling of the face/lips/tongue/throat, abdominal pain, nausea or vomiting. An allergic reaction is usually treated with medication. If your health care provider prescribed you an epinephrine injection device, make sure to keep it with you at all times.  SEEK IMMEDIATE MEDICAL CARE IF YOU HAVE ANY OF THE FOLLOWING SYMPTOMS: allergic reaction severe enough that required you to use epinephrine, tightness in your chest, swelling around your lips/tongue/throat, abdominal pain, vomiting or diarrhea, or lightheadedness/dizziness. These symptoms may represent a serious problem that is an emergency. Do not wait to see if the symptoms will go away. Use your auto-injector pen or anaphylaxis kit as you have been instructed. Call 911 and do not drive yourself to the hospital.         PRINCIPAL DISCHARGE DIAGNOSIS  Diagnosis: Anaphylaxis  Assessment and Plan of Treatment: An anaphylactic reaction (anaphylaxis) is a sudden, severe allergic reaction that affects multiple areas of the body. An allergic reaction is an abnormal reaction to a substance (allergen) by the body's defense system. Common allergens include medicines, food, insect bites or stings, and blood products. The body releases certain proteins into the blood that can cause a variety of symptoms such as an itchy rash, wheezing, swelling of the face/lips/tongue/throat, abdominal pain, nausea or vomiting. An allergic reaction is usually treated with medication. If your health care provider prescribed you an epinephrine injection device, make sure to keep it with you at all times.  SEEK IMMEDIATE MEDICAL CARE IF YOU HAVE ANY OF THE FOLLOWING SYMPTOMS: allergic reaction severe enough that required you to use epinephrine, tightness in your chest, swelling around your lips/tongue/throat, abdominal pain, vomiting or diarrhea, or lightheadedness/dizziness. These symptoms may represent a serious problem that is an emergency. Do not wait to see if the symptoms will go away. Use your auto-injector pen or anaphylaxis kit as you have been instructed. Call 911 and do not drive yourself to the hospital.  Take your medications as prescribed and see your Allergist within 10 days of discharge. You had C4 and C1 esterase inhibitor levels drawn, and the results are pending. Obtain the results of these blood tests from Medical Records before following up with your Allergist.

## 2023-02-27 NOTE — PROGRESS NOTE ADULT - SUBJECTIVE AND OBJECTIVE BOX
Patient is a 22y old  Male who presents with a chief complaint of Allergic Reaction (26 Feb 2023 19:07)    Pt seen at bedside today with attending. States he feels improved with regards to neck pain, headache and swelling to the face. He feel his rash has gotten worse and is now more itchy and burn and he describes it as a "sunburn".     ICU Vital Signs Last 24 Hrs  T(C): 36.9 (27 Feb 2023 14:00), Max: 38.1 (26 Feb 2023 17:35)  T(F): 98.5 (27 Feb 2023 14:00), Max: 100.5 (26 Feb 2023 17:35)  HR: 112 (27 Feb 2023 14:00) (83 - 115)  BP: 141/75 (27 Feb 2023 14:00) (123/60 - 141/75)  RR: 18 (27 Feb 2023 14:00) (18 - 20)  SpO2: 99% (27 Feb 2023 04:35) (97% - 99%)    O2 Parameters below as of 27 Feb 2023 04:35  Patient On (Oxygen Delivery Method): room air    penicillin (Unknown)  sulfa drugs (Rash; Angioedema; Short breath; Urticaria; Hives)    PHYSICAL EXAM:  General: Patient laying in bed, in NAD. mother at bedside  HEAD:  Atraumatic, Normocephalic  EYES: EOMI, PERRLA, conjunctiva and sclera exhibiting erythema. No crusting or open wounds noted. edema to lips improved from yesterday  Mouth: No sores noted, buccal mucosa pink/moist.  Chest/Lung: No signs of cardiopulmonary compromise.   Skin: Diffuse maculopapular rash now coalescing noted to torso, b/l  upper and lower extremities including the soles  no open wounds, blistering, or sloughing. worsening erythema today. negative Nikolskys sign     Patient is a 22y old  Male who presents with a chief complaint of Allergic Reaction (26 Feb 2023 19:07)    Pt seen at bedside today with attending. States he feels significantly improved with regards to neck pain, headache and swelling to the face. He feel his rash has gotten worse and is now more itchy and burn and he describes it as "feeling like a sunburn".   Mother at bedside   ICU Vital Signs Last 24 Hrs  T(C): 36.9 (27 Feb 2023 14:00), Max: 38.1 (26 Feb 2023 17:35)  T(F): 98.5 (27 Feb 2023 14:00), Max: 100.5 (26 Feb 2023 17:35)  HR: 112 (27 Feb 2023 14:00) (83 - 115)  BP: 141/75 (27 Feb 2023 14:00) (123/60 - 141/75)  RR: 18 (27 Feb 2023 14:00) (18 - 20)  SpO2: 99% (27 Feb 2023 04:35) (97% - 99%)    O2 Parameters below as of 27 Feb 2023 04:35  Patient On (Oxygen Delivery Method): room air    penicillin (Unknown)  sulfa drugs (Rash; Angioedema; Short breath; Urticaria; Hives)    PHYSICAL EXAM:  General: Patient laying in bed, in NAD. mother at bedside  HEAD:  Atraumatic, Normocephalic  EYES: EOMI, PERRLA, conjunctiva and sclera exhibiting clearer. No crusting or open wounds noted. Edema of lips improved from yesterday  Mouth: No sores noted, buccal mucosa pink/moist.  Chest/Lung: No signs of cardiopulmonary compromise.   Skin: Diffuse maculopapular rash now coalescing noted to torso with intense erythema of shoulders b/l  upper and lower extremities including the soles  no open wounds, blistering, or sloughing.   worsening erythema today. negative Nikolsky's sign

## 2023-02-27 NOTE — PROGRESS NOTE ADULT - ASSESSMENT
Diffuse rash    Plan:   - no burn intervention at this time  - No LWC as no open wounds, we did advise pt wounds may open up  - Remainder of care per primary team    Plan discussed with patient and mother at bedside, verbalized agreement Diffuse rash    Plan:   - no burn intervention at this time  - No LWC as no open wounds, we did advise pt wounds may open up-  - Remainder of care per primary team    Plan discussed with patient and mother at bedside, verbalized agreement

## 2023-02-27 NOTE — PATIENT PROFILE ADULT - FALL HARM RISK - UNIVERSAL INTERVENTIONS
Bed in lowest position, wheels locked, appropriate side rails in place/Call bell, personal items and telephone in reach/Instruct patient to call for assistance before getting out of bed or chair/Non-slip footwear when patient is out of bed/[place] to call system/Physically safe environment - no spills, clutter or unnecessary equipment/Purposeful Proactive Rounding/Room/bathroom lighting operational, light cord in reach

## 2023-02-27 NOTE — PATIENT PROFILE ADULT - DO YOU FEEL LIKE HURTING YOURSELF OR OTHERS?
Left msg for mom to call [facility] - Dr Noelle Graves for swab  Call office with any further questions  no

## 2023-02-28 ENCOUNTER — TRANSCRIPTION ENCOUNTER (OUTPATIENT)
Age: 23
End: 2023-02-28

## 2023-02-28 VITALS
SYSTOLIC BLOOD PRESSURE: 110 MMHG | HEART RATE: 121 BPM | OXYGEN SATURATION: 100 % | RESPIRATION RATE: 18 BRPM | TEMPERATURE: 98 F | DIASTOLIC BLOOD PRESSURE: 63 MMHG

## 2023-02-28 LAB
ALBUMIN SERPL ELPH-MCNC: 4.1 G/DL — SIGNIFICANT CHANGE UP (ref 3.5–5.2)
ALP SERPL-CCNC: 57 U/L — SIGNIFICANT CHANGE UP (ref 30–115)
ALT FLD-CCNC: 21 U/L — SIGNIFICANT CHANGE UP (ref 0–41)
ANION GAP SERPL CALC-SCNC: 12 MMOL/L — SIGNIFICANT CHANGE UP (ref 7–14)
AST SERPL-CCNC: 14 U/L — SIGNIFICANT CHANGE UP (ref 0–41)
BASOPHILS # BLD AUTO: 0.03 K/UL — SIGNIFICANT CHANGE UP (ref 0–0.2)
BASOPHILS NFR BLD AUTO: 0.4 % — SIGNIFICANT CHANGE UP (ref 0–1)
BILIRUB SERPL-MCNC: 0.3 MG/DL — SIGNIFICANT CHANGE UP (ref 0.2–1.2)
BUN SERPL-MCNC: 14 MG/DL — SIGNIFICANT CHANGE UP (ref 10–20)
C4 SERPL-MCNC: 44 MG/DL — HIGH (ref 13–39)
CALCIUM SERPL-MCNC: 9.1 MG/DL — SIGNIFICANT CHANGE UP (ref 8.4–10.5)
CHLORIDE SERPL-SCNC: 103 MMOL/L — SIGNIFICANT CHANGE UP (ref 98–110)
CO2 SERPL-SCNC: 28 MMOL/L — SIGNIFICANT CHANGE UP (ref 17–32)
CREAT SERPL-MCNC: 1.4 MG/DL — SIGNIFICANT CHANGE UP (ref 0.7–1.5)
CULTURE RESULTS: SIGNIFICANT CHANGE UP
EGFR: 72 ML/MIN/1.73M2 — SIGNIFICANT CHANGE UP
EOSINOPHIL # BLD AUTO: 0.29 K/UL — SIGNIFICANT CHANGE UP (ref 0–0.7)
EOSINOPHIL NFR BLD AUTO: 4.1 % — SIGNIFICANT CHANGE UP (ref 0–8)
GLUCOSE SERPL-MCNC: 106 MG/DL — HIGH (ref 70–99)
HCT VFR BLD CALC: 42.9 % — SIGNIFICANT CHANGE UP (ref 42–52)
HGB BLD-MCNC: 14.4 G/DL — SIGNIFICANT CHANGE UP (ref 14–18)
IMM GRANULOCYTES NFR BLD AUTO: 0.4 % — HIGH (ref 0.1–0.3)
LYMPHOCYTES # BLD AUTO: 1.36 K/UL — SIGNIFICANT CHANGE UP (ref 1.2–3.4)
LYMPHOCYTES # BLD AUTO: 19.3 % — LOW (ref 20.5–51.1)
MAGNESIUM SERPL-MCNC: 2.1 MG/DL — SIGNIFICANT CHANGE UP (ref 1.8–2.4)
MCHC RBC-ENTMCNC: 29.4 PG — SIGNIFICANT CHANGE UP (ref 27–31)
MCHC RBC-ENTMCNC: 33.6 G/DL — SIGNIFICANT CHANGE UP (ref 32–37)
MCV RBC AUTO: 87.7 FL — SIGNIFICANT CHANGE UP (ref 80–94)
MONOCYTES # BLD AUTO: 0.4 K/UL — SIGNIFICANT CHANGE UP (ref 0.1–0.6)
MONOCYTES NFR BLD AUTO: 5.7 % — SIGNIFICANT CHANGE UP (ref 1.7–9.3)
NEUTROPHILS # BLD AUTO: 4.93 K/UL — SIGNIFICANT CHANGE UP (ref 1.4–6.5)
NEUTROPHILS NFR BLD AUTO: 70.1 % — SIGNIFICANT CHANGE UP (ref 42.2–75.2)
NRBC # BLD: 0 /100 WBCS — SIGNIFICANT CHANGE UP (ref 0–0)
PLATELET # BLD AUTO: 162 K/UL — SIGNIFICANT CHANGE UP (ref 130–400)
POTASSIUM SERPL-MCNC: 5.2 MMOL/L — HIGH (ref 3.5–5)
POTASSIUM SERPL-SCNC: 5.2 MMOL/L — HIGH (ref 3.5–5)
PROT SERPL-MCNC: 6.2 G/DL — SIGNIFICANT CHANGE UP (ref 6–8)
RBC # BLD: 4.89 M/UL — SIGNIFICANT CHANGE UP (ref 4.7–6.1)
RBC # FLD: 12.2 % — SIGNIFICANT CHANGE UP (ref 11.5–14.5)
SODIUM SERPL-SCNC: 143 MMOL/L — SIGNIFICANT CHANGE UP (ref 135–146)
SPECIMEN SOURCE: SIGNIFICANT CHANGE UP
WBC # BLD: 7.04 K/UL — SIGNIFICANT CHANGE UP (ref 4.8–10.8)
WBC # FLD AUTO: 7.04 K/UL — SIGNIFICANT CHANGE UP (ref 4.8–10.8)

## 2023-02-28 PROCEDURE — 99231 SBSQ HOSP IP/OBS SF/LOW 25: CPT | Mod: GC

## 2023-02-28 PROCEDURE — 99239 HOSP IP/OBS DSCHRG MGMT >30: CPT

## 2023-02-28 RX ORDER — DIPHENHYDRAMINE HCL 50 MG
1 CAPSULE ORAL
Qty: 60 | Refills: 0
Start: 2023-02-28 | End: 2023-03-29

## 2023-02-28 RX ORDER — EPINEPHRINE 0.3 MG/.3ML
0.3 INJECTION INTRAMUSCULAR; SUBCUTANEOUS
Qty: 2 | Refills: 0
Start: 2023-02-28 | End: 2023-03-01

## 2023-02-28 RX ADMIN — Medication 60 MILLIGRAM(S): at 06:18

## 2023-02-28 RX ADMIN — Medication 1 APPLICATION(S): at 06:18

## 2023-02-28 RX ADMIN — FAMOTIDINE 20 MILLIGRAM(S): 10 INJECTION INTRAVENOUS at 06:17

## 2023-02-28 RX ADMIN — Medication 50 MILLIGRAM(S): at 06:18

## 2023-02-28 NOTE — PROGRESS NOTE ADULT - SUBJECTIVE AND OBJECTIVE BOX
BURN PROGRESS NOTE    Patient is a 22y old  Male who presents with a chief complaint of Allergic Reaction (28 Feb 2023 08:13)      SUBJECTIVE/INTERVAL EVENTS:  No acute events overnight.    Vital Signs Last 24 Hrs  T(C): 36.7 (28 Feb 2023 05:35), Max: 36.9 (27 Feb 2023 14:00)  T(F): 98 (28 Feb 2023 05:35), Max: 98.5 (27 Feb 2023 14:00)  HR: 121 (28 Feb 2023 05:35) (109 - 121)  BP: 110/63 (28 Feb 2023 05:35) (110/63 - 141/75)  RR: 18 (28 Feb 2023 05:35) (18 - 18)  SpO2: 100% (28 Feb 2023 05:35) (100% - 100%)    Parameters below as of 28 Feb 2023 05:35  Patient On (Oxygen Delivery Method): room air      02-28-23 @ 07:14  WBC 7.04 | H/H 14.4/42.9 | Plt 162  Na 143 | K 5.2<H> | Cl 103 | CO2 28 | BUN 14 | Cr 1.4 | Glu 106<H>  Ca 9.1 | Mg 2.1 | Phosphorus --    AST 14 | ALT 21 | Alk Phos 57  Alb 4.1 | TP 6.2 | T. Bili 0.3 | D. Bili --    02-27-23 @ 07:20  CRP 57.1<H> | Procalcitonin 0.12<H>    02-27-23 @ 07:20  Hgb A1c 5.2% | Mean plasma glucose 103    02-27-23 @ 07:20  Total Chol 188 | Non- | HDL 64  LDL-Direct -- | LDL-Calc 114<H> | TG 49      PHYSICAL EXAM:  General: Patient comfortable, reports improvement overall  HEAD:  Atraumatic, Normocephalic.  EYES: EOMI, PERRLA, clear conjunctiva without scleral icterus. No crusting or open wounds noted. Resolving edema to lips.  Mouth: No oropharyngeal sores or lesions noted, buccal mucosa pink/moist.  Chest/Lung: No signs of cardiopulmonary compromise.   Skin: Diffuse maculopapular rash including B/L UEs and LEs, now coalescing on torso; no open wounds, blistering, or sloughing; negative Nikolskys sign; erythema stable from yesterday

## 2023-02-28 NOTE — DISCHARGE NOTE NURSING/CASE MANAGEMENT/SOCIAL WORK - PATIENT PORTAL LINK FT
You can access the FollowMyHealth Patient Portal offered by [facility] by registering at the following website: [email or web address]. By joining [employer]’s FollowMyHealth portal, you will also be able to view your health information using other applications (apps) compatible with our system.

## 2023-02-28 NOTE — PROGRESS NOTE ADULT - ASSESSMENT
22 year old man with chronic sinusitis is presenting for acute allergic reaction, presumably to bactrim    #Anaphylaxis - improved  - s/p solumedrol 125 mg, benadryl 50 mg once and duoneb  - c/w solumedrol 60mg qd and benadryl 50 mg every 8 hrs  - CRP 57, ESR 12  - C4 level and C1 esterase inh level pending  - Patient told to immediately call the nurse in case he feels that his throat is closing down, if he has SOB or difficulty breathy, or if he starts wheezing or getting dizzy  - Patient advised not to take bactrim any more  - Patient has allergist appt in april  - f/u Allergist consult     #Fever  - Maybe related to uriticaria  - No urinary symptoms, no cough, SOB has resolved, no diarrhea, no URI  - F/u blood cx and UA     Diet: Regular  Activity: as tolerated  GI ppx; none  DVT ppx: none  Dispo: home today vs tomorrow

## 2023-02-28 NOTE — PROGRESS NOTE ADULT - ASSESSMENT
Diffuse rash, possible SJS, low risk by SCORTEN mortality assessment.   No fever, blisters, mucosal involvement, dysuria or dyschezia. No chest tightness, angioedema, abdominal pain, vomiting or diarrhea.     Plan:   - No burn intervention at this time  - No LWC as no open wounds; patient advised that wounds may open up  - Advised to avoid sulfa medications, and gave precautions for photosensitivity  - Remainder of care per primary team    Plan discussed with patient and mother at bedside, verbalized agreement Diffuse rash, possible SJS, low risk by SCORTEN mortality assessment.   No fever, blisters, mucosal involvement, dysuria or dyschezia. No chest tightness, angioedema, abdominal pain, vomiting or diarrhea.     Plan:   - No burn intervention at this time  - No LWC as no open wounds; patient advised that wounds may open up  - Advised to avoid sulfa medications, and gave precautions for photosensitivity  - Remainder of care per primary team    Plan discussed with patient at bedside, verbalized agreement

## 2023-02-28 NOTE — PROGRESS NOTE ADULT - ATTENDING COMMENTS
As above patient seen during daily rounds  He  reports all head and neck complaints have resolved and skin is now itchy -no longer "feels like sunburn"    Exam -   significantly decreased erythema upper torso  Slightly faded coalescing maculopapular rash lower back, and palms  No open wounds or blisters.    Likely SJS - resolving   Patient scheduled for discharge today  Discussed sun protection and monitoring  Advised patient that he may experienced significant skin peeling, and some hyperpigmentation  Concerns addressed

## 2023-02-28 NOTE — PROGRESS NOTE ADULT - SUBJECTIVE AND OBJECTIVE BOX
Internal Medicine Progress Note    Location: [address] (Back) 026 [address]  Patient Name: LORRIE HORNE  MRN: [id number]    Patient is a 22y old  Male who presents with a chief complaint of Allergic Reaction (2023 19:05)      Subjective  NAEON. This morning, patient was seen and examined at bedside. Patient reports doing well, denies any new symptoms/complaints      Objective  Vital Signs Last 24 Hrs  T(C): 36.7 (2023 05:35), Max: 36.9 (2023 14:00)  T(F): 98 (2023 05:35), Max: 98.5 (2023 14:00)  HR: 121 (2023 05:35) (109 - 121)  BP: 110/63 (2023 05:35) (110/63 - 141/75)  BP(mean): --  RR: 18 (2023 05:35) (18 - 18)  SpO2: 100% (2023 05:35) (100% - 100%)    Parameters below as of 2023 05:35  Patient On (Oxygen Delivery Method): room air        Intake/output     Physical Exam  General: nontoxic, NAD  Head: normocephalic, atraumatic  Eyes: conjunctiva and sclera clear  ENT: moist mucous membranes  Neck: supple  Chest/lung: nonlabored respirations on RA, CTAB  Heart: tachycardic, +S1 S2  Abdomen: soft, nontender, nondistended  Extremities: warm and well perfused, no LE pitting edema  Neuro: no gross focal deficits  Psych: AAOx3  Skin: urticaria of chest, face, and extremities, improving     Labs  CBC:                       14.4   7.04  )-----------( 162      ( 2023 07:14 )             42.9     BMP:     139  |  102  |  11  ----------------------------<  138<H>  5.2<H>   |  24  |  1.0    Ca    9.7      2023 07:20    TPro  6.9  /  Alb  4.6  /  TBili  0.4  /  DBili  x   /  AST  17  /  ALT  22  /  AlkPhos  69      Coag: PT/INR - ( 2023 15:58 )   PT: 12.40 sec;   INR: 1.08 ratio         PTT - ( 2023 15:58 )  PTT:33.9 sec  ABG:   Cardiac markers:       Micro:  Urinalysis Basic - ( 2023 08:20 )    Color: Yellow / Appearance: Clear / S.027 / pH: x  Gluc: x / Ketone: Negative  / Bili: Negative / Urobili: <2 mg/dL   Blood: x / Protein: Trace / Nitrite: Negative   Leuk Esterase: Negative / RBC: x / WBC x   Sq Epi: x / Non Sq Epi: x / Bacteria: x        Culture - Blood (collected 2023 16:05)  Source: .Blood Blood-Peripheral  Preliminary Report (2023 01:02):    No growth to date.    Culture - Blood (collected 2023 16:05)  Source: .Blood Blood-Peripheral  Preliminary Report (2023 01:02):    No growth to date.        Imaging:    Standing Medications  MEDICATIONS  (STANDING):  diphenhydrAMINE 50 milliGRAM(s) Oral every 8 hours  famotidine    Tablet 20 milliGRAM(s) Oral two times a day  methylPREDNISolone sodium succinate Injectable 60 milliGRAM(s) IV Push daily  triamcinolone 0.1% Cream 1 Application(s) Topical every 12 hours    PRN Medications  MEDICATIONS  (PRN):  acetaminophen     Tablet .. 650 milliGRAM(s) Oral every 6 hours PRN Temp greater or equal to 38C (100.4F), Mild Pain (1 - 3)  melatonin 3 milliGRAM(s) Oral at bedtime PRN Insomnia   Internal Medicine Progress Note    Location: [facility] (Back) 026 C  Patient Name: LORRIE HORNE  MRN: [id number]    Patient is a 22y old  Male who presents with a chief complaint of Allergic Reaction (2023 19:05)      Subjective  NAEON. This morning, patient was seen and examined at bedside. Patient reports improvement in facial/chest rash but rash spread to lower extremities however it is not itchy below the waist. Denies any SOB    Objective  Vital Signs Last 24 Hrs  T(C): 36.7 (2023 05:35), Max: 36.9 (2023 14:00)  T(F): 98 (2023 05:35), Max: 98.5 (2023 14:00)  HR: 121 (2023 05:35) (109 - 121)  BP: 110/63 (2023 05:35) (110/63 - 141/75)  BP(mean): --  RR: 18 (2023 05:35) (18 - 18)  SpO2: 100% (2023 05:35) (100% - 100%)    Parameters below as of 2023 05:35  Patient On (Oxygen Delivery Method): room air        Intake/output     Physical Exam  General: nontoxic, NAD  Head: normocephalic, atraumatic  Eyes: conjunctiva and sclera clear  ENT: moist mucous membranes  Neck: supple  Chest/lung: nonlabored respirations on RA, CTAB  Heart: tachycardic, +S1 S2  Abdomen: soft, nontender, nondistended  Extremities: warm and well perfused, no LE pitting edema  Neuro: no gross focal deficits  Psych: AAOx3  Skin: urticaria of chest, face, and extremities, improving     Labs  CBC:                       14.4   7.04  )-----------( 162      ( 2023 07:14 )             42.9     BMP:     139  |  102  |  11  ----------------------------<  138<H>  5.2<H>   |  24  |  1.0    Ca    9.7      2023 07:20    TPro  6.9  /  Alb  4.6  /  TBili  0.4  /  DBili  x   /  AST  17  /  ALT  22  /  AlkPhos  69  -    Coag: PT/INR - ( 2023 15:58 )   PT: 12.40 sec;   INR: 1.08 ratio         PTT - ( 2023 15:58 )  PTT:33.9 sec  ABG:   Cardiac markers:       Micro:  Urinalysis Basic - ( 2023 08:20 )    Color: Yellow / Appearance: Clear / S.027 / pH: x  Gluc: x / Ketone: Negative  / Bili: Negative / Urobili: <2 mg/dL   Blood: x / Protein: Trace / Nitrite: Negative   Leuk Esterase: Negative / RBC: x / WBC x   Sq Epi: x / Non Sq Epi: x / Bacteria: x        Culture - Blood (collected 2023 16:05)  Source: .Blood Blood-Peripheral  Preliminary Report (2023 01:02):    No growth to date.    Culture - Blood (collected 2023 16:05)  Source: .Blood Blood-Peripheral  Preliminary Report (2023 01:02):    No growth to date.        Imaging:    Standing Medications  MEDICATIONS  (STANDING):  diphenhydrAMINE 50 milliGRAM(s) Oral every 8 hours  famotidine    Tablet 20 milliGRAM(s) Oral two times a day  methylPREDNISolone sodium succinate Injectable 60 milliGRAM(s) IV Push daily  triamcinolone 0.1% Cream 1 Application(s) Topical every 12 hours    PRN Medications  MEDICATIONS  (PRN):  acetaminophen     Tablet .. 650 milliGRAM(s) Oral every 6 hours PRN Temp greater or equal to 38C (100.4F), Mild Pain (1 - 3)  melatonin 3 milliGRAM(s) Oral at bedtime PRN Insomnia

## 2023-03-02 LAB — C1INH FUNCTIONAL/C1INH TOTAL MFR SERPL: 36 MG/DL — SIGNIFICANT CHANGE UP (ref 21–39)

## 2023-03-04 LAB
CULTURE RESULTS: SIGNIFICANT CHANGE UP
CULTURE RESULTS: SIGNIFICANT CHANGE UP
SPECIMEN SOURCE: SIGNIFICANT CHANGE UP
SPECIMEN SOURCE: SIGNIFICANT CHANGE UP

## 2023-03-05 DIAGNOSIS — K14.8 OTHER DISEASES OF TONGUE: ICD-10-CM

## 2023-03-05 DIAGNOSIS — L50.0 ALLERGIC URTICARIA: ICD-10-CM

## 2023-03-05 DIAGNOSIS — R06.02 SHORTNESS OF BREATH: ICD-10-CM
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
## 2023-03-05 DIAGNOSIS — T88.6XXA ANAPHYLACTIC REACTION DUE TO ADVERSE EFFECT OF CORRECT DRUG OR MEDICAMENT PROPERLY ADMINISTERED, INITIAL ENCOUNTER: ICD-10-CM

## 2023-03-05 DIAGNOSIS — J32.9 CHRONIC SINUSITIS, UNSPECIFIED: ICD-10-CM

## 2023-03-05 DIAGNOSIS — Z88.0 ALLERGY STATUS TO PENICILLIN: ICD-10-CM

## 2023-03-05 DIAGNOSIS — R22.2 LOCALIZED SWELLING, MASS AND LUMP, TRUNK: ICD-10-CM

## 2023-03-05 DIAGNOSIS — H10.9 UNSPECIFIED CONJUNCTIVITIS: ICD-10-CM

## 2023-03-05 DIAGNOSIS — R07.89 OTHER CHEST PAIN: ICD-10-CM

## 2023-03-05 DIAGNOSIS — R00.0 TACHYCARDIA, UNSPECIFIED: ICD-10-CM

## 2023-03-05 DIAGNOSIS — R50.9 FEVER, UNSPECIFIED: ICD-10-CM

## 2023-03-05 DIAGNOSIS — R22.0 LOCALIZED SWELLING, MASS AND LUMP, HEAD: ICD-10-CM

## 2023-03-05 DIAGNOSIS — T36.8X5A ADVERSE EFFECT OF OTHER SYSTEMIC ANTIBIOTICS, INITIAL ENCOUNTER: ICD-10-CM
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
## 2023-03-05 DIAGNOSIS — L27.0 GENERALIZED SKIN ERUPTION DUE TO DRUGS AND MEDICAMENTS TAKEN INTERNALLY: ICD-10-CM

## 2023-03-05 DIAGNOSIS — Z87.891 PERSONAL HISTORY OF NICOTINE DEPENDENCE: ICD-10-CM

## 2023-03-06 PROBLEM — J32.9 CHRONIC SINUSITIS, UNSPECIFIED: Chronic | Status: ACTIVE | Noted: 2023-02-26

## 2023-04-03 ENCOUNTER — RESULT CHARGE (OUTPATIENT)
Age: 23
End: 2023-04-03

## 2023-04-03 ENCOUNTER — APPOINTMENT (OUTPATIENT)
Dept: CARDIOLOGY | Facility: CLINIC | Age: 23
End: 2023-04-03
Payer: COMMERCIAL

## 2023-04-03 VITALS
WEIGHT: 244 LBS | HEIGHT: 72 IN | BODY MASS INDEX: 33.05 KG/M2 | DIASTOLIC BLOOD PRESSURE: 80 MMHG | HEART RATE: 77 BPM | SYSTOLIC BLOOD PRESSURE: 140 MMHG

## 2023-04-03 DIAGNOSIS — I10 ESSENTIAL (PRIMARY) HYPERTENSION: ICD-10-CM

## 2023-04-03 DIAGNOSIS — R07.9 CHEST PAIN, UNSPECIFIED: ICD-10-CM

## 2023-04-03 DIAGNOSIS — R00.2 PALPITATIONS: ICD-10-CM

## 2023-04-03 PROCEDURE — 93000 ELECTROCARDIOGRAM COMPLETE: CPT

## 2023-04-03 PROCEDURE — 99214 OFFICE O/P EST MOD 30 MIN: CPT | Mod: 25

## 2023-04-09 PROBLEM — R00.2 PALPITATIONS: Status: ACTIVE | Noted: 2021-04-09

## 2023-04-09 PROBLEM — I10 HTN (HYPERTENSION): Status: ACTIVE | Noted: 2023-04-09

## 2023-04-09 PROBLEM — R07.9 CHEST PAIN: Status: ACTIVE | Noted: 2021-04-09

## 2023-04-09 NOTE — ASSESSMENT
[FreeTextEntry1] : Post-COVID chest pain / dyspnea / palpitations (resolved)\par Reassuring EST / ECHO.\par \par BP mildly elevated.\par Borderline Rx.

## 2023-04-09 NOTE — HISTORY OF PRESENT ILLNESS
[FreeTextEntry1] : 20 year-old male presents for cardiac evaluation.\par \par No cardiac history.\par No clear risk factors.\par \par COVID 19 infection (1/2021).  Mild course.\par \par New palpitations.  Generally random.  Sometimes associated with stress / no other clear precipitants.  Generally brief.  No associated symptoms.  No lightheadedness / syncope.\par \par Chest discomfort variably related to meals.  Not exertional.\par \par Physical work ().  Occasional mild exertional dyspnea.  Breathing otherwise comfortable.

## 2023-04-09 NOTE — DISCUSSION/SUMMARY
[FreeTextEntry1] : Weight loss / exercise.\par Monitor BP.\par Rx pending above.\par Follow-up 3-months.

## 2023-04-09 NOTE — REASON FOR VISIT
[Initial Evaluation] : an initial evaluation of [Chest Pain] : chest pain [Palpitations] : palpitations [FreeTextEntry1] : Last seen .\par \par Feels well.\par \par Father .\par \par Not much exercise.  Physical job.  No exertional symptoms.\par \par No angina.  Breathing comfortable.  No palpitations, lightheadedness, syncope.\par \par ECHO (2021): Normal biventricular function.  No significant valve disease.\par EST (2021): 9:15 (92%).  nL EKG.

## 2023-07-17 ENCOUNTER — APPOINTMENT (OUTPATIENT)
Dept: CARDIOLOGY | Facility: CLINIC | Age: 23
End: 2023-07-17

## 2023-10-31 NOTE — DISCHARGE NOTE PROVIDER - CARE PROVIDER_API CALL
Samuel Woodard)  Internal Medicine  [address] [place] [address], [address]  [place], [place] [id number]  Phone: [phone]  Fax: [phone]  Follow Up Time:    [place]

## 2023-11-13 ENCOUNTER — EMERGENCY (EMERGENCY)
Facility: HOSPITAL | Age: 23
LOS: 0 days | Discharge: HOME | End: 2023-11-13
Attending: STUDENT IN AN ORGANIZED HEALTH CARE EDUCATION/TRAINING PROGRAM
Payer: COMMERCIAL

## 2023-11-13 VITALS
HEART RATE: 85 BPM | SYSTOLIC BLOOD PRESSURE: 157 MMHG | RESPIRATION RATE: 17 BRPM | OXYGEN SATURATION: 100 % | DIASTOLIC BLOOD PRESSURE: 85 MMHG | TEMPERATURE: 97 F

## 2023-11-13 VITALS
DIASTOLIC BLOOD PRESSURE: 65 MMHG | SYSTOLIC BLOOD PRESSURE: 120 MMHG | HEART RATE: 74 BPM | OXYGEN SATURATION: 100 % | TEMPERATURE: 98 F | RESPIRATION RATE: 18 BRPM

## 2023-11-13 DIAGNOSIS — R42 DIZZINESS AND GIDDINESS: ICD-10-CM

## 2023-11-13 DIAGNOSIS — R51.9 HEADACHE, UNSPECIFIED: ICD-10-CM

## 2023-11-13 DIAGNOSIS — H53.149 VISUAL DISCOMFORT, UNSPECIFIED: ICD-10-CM

## 2023-11-13 LAB
ALBUMIN SERPL ELPH-MCNC: 5 G/DL — SIGNIFICANT CHANGE UP (ref 3.5–5.2)
ALBUMIN SERPL ELPH-MCNC: 5 G/DL — SIGNIFICANT CHANGE UP (ref 3.5–5.2)
ALP SERPL-CCNC: 61 U/L — SIGNIFICANT CHANGE UP (ref 30–115)
ALP SERPL-CCNC: 61 U/L — SIGNIFICANT CHANGE UP (ref 30–115)
ALT FLD-CCNC: 21 U/L — SIGNIFICANT CHANGE UP (ref 0–41)
ALT FLD-CCNC: 21 U/L — SIGNIFICANT CHANGE UP (ref 0–41)
ANION GAP SERPL CALC-SCNC: 11 MMOL/L — SIGNIFICANT CHANGE UP (ref 7–14)
ANION GAP SERPL CALC-SCNC: 11 MMOL/L — SIGNIFICANT CHANGE UP (ref 7–14)
AST SERPL-CCNC: 20 U/L — SIGNIFICANT CHANGE UP (ref 0–41)
AST SERPL-CCNC: 20 U/L — SIGNIFICANT CHANGE UP (ref 0–41)
BASOPHILS # BLD AUTO: 0.02 K/UL — SIGNIFICANT CHANGE UP (ref 0–0.2)
BASOPHILS # BLD AUTO: 0.02 K/UL — SIGNIFICANT CHANGE UP (ref 0–0.2)
BASOPHILS NFR BLD AUTO: 0.4 % — SIGNIFICANT CHANGE UP (ref 0–1)
BASOPHILS NFR BLD AUTO: 0.4 % — SIGNIFICANT CHANGE UP (ref 0–1)
BILIRUB SERPL-MCNC: 0.5 MG/DL — SIGNIFICANT CHANGE UP (ref 0.2–1.2)
BILIRUB SERPL-MCNC: 0.5 MG/DL — SIGNIFICANT CHANGE UP (ref 0.2–1.2)
BUN SERPL-MCNC: 12 MG/DL — SIGNIFICANT CHANGE UP (ref 10–20)
BUN SERPL-MCNC: 12 MG/DL — SIGNIFICANT CHANGE UP (ref 10–20)
CALCIUM SERPL-MCNC: 9.9 MG/DL — SIGNIFICANT CHANGE UP (ref 8.4–10.5)
CALCIUM SERPL-MCNC: 9.9 MG/DL — SIGNIFICANT CHANGE UP (ref 8.4–10.5)
CHLORIDE SERPL-SCNC: 101 MMOL/L — SIGNIFICANT CHANGE UP (ref 98–110)
CHLORIDE SERPL-SCNC: 101 MMOL/L — SIGNIFICANT CHANGE UP (ref 98–110)
CO2 SERPL-SCNC: 26 MMOL/L — SIGNIFICANT CHANGE UP (ref 17–32)
CO2 SERPL-SCNC: 26 MMOL/L — SIGNIFICANT CHANGE UP (ref 17–32)
CREAT SERPL-MCNC: 1 MG/DL — SIGNIFICANT CHANGE UP (ref 0.7–1.5)
CREAT SERPL-MCNC: 1 MG/DL — SIGNIFICANT CHANGE UP (ref 0.7–1.5)
EGFR: 108 ML/MIN/1.73M2 — SIGNIFICANT CHANGE UP
EGFR: 108 ML/MIN/1.73M2 — SIGNIFICANT CHANGE UP
EOSINOPHIL # BLD AUTO: 0.07 K/UL — SIGNIFICANT CHANGE UP (ref 0–0.7)
EOSINOPHIL # BLD AUTO: 0.07 K/UL — SIGNIFICANT CHANGE UP (ref 0–0.7)
EOSINOPHIL NFR BLD AUTO: 1.4 % — SIGNIFICANT CHANGE UP (ref 0–8)
EOSINOPHIL NFR BLD AUTO: 1.4 % — SIGNIFICANT CHANGE UP (ref 0–8)
GLUCOSE SERPL-MCNC: 99 MG/DL — SIGNIFICANT CHANGE UP (ref 70–99)
GLUCOSE SERPL-MCNC: 99 MG/DL — SIGNIFICANT CHANGE UP (ref 70–99)
HCT VFR BLD CALC: 45.8 % — SIGNIFICANT CHANGE UP (ref 42–52)
HCT VFR BLD CALC: 45.8 % — SIGNIFICANT CHANGE UP (ref 42–52)
HGB BLD-MCNC: 15.7 G/DL — SIGNIFICANT CHANGE UP (ref 14–18)
HGB BLD-MCNC: 15.7 G/DL — SIGNIFICANT CHANGE UP (ref 14–18)
IMM GRANULOCYTES NFR BLD AUTO: 0.2 % — SIGNIFICANT CHANGE UP (ref 0.1–0.3)
IMM GRANULOCYTES NFR BLD AUTO: 0.2 % — SIGNIFICANT CHANGE UP (ref 0.1–0.3)
LYMPHOCYTES # BLD AUTO: 1.37 K/UL — SIGNIFICANT CHANGE UP (ref 1.2–3.4)
LYMPHOCYTES # BLD AUTO: 1.37 K/UL — SIGNIFICANT CHANGE UP (ref 1.2–3.4)
LYMPHOCYTES # BLD AUTO: 28 % — SIGNIFICANT CHANGE UP (ref 20.5–51.1)
LYMPHOCYTES # BLD AUTO: 28 % — SIGNIFICANT CHANGE UP (ref 20.5–51.1)
MCHC RBC-ENTMCNC: 29.5 PG — SIGNIFICANT CHANGE UP (ref 27–31)
MCHC RBC-ENTMCNC: 29.5 PG — SIGNIFICANT CHANGE UP (ref 27–31)
MCHC RBC-ENTMCNC: 34.3 G/DL — SIGNIFICANT CHANGE UP (ref 32–37)
MCHC RBC-ENTMCNC: 34.3 G/DL — SIGNIFICANT CHANGE UP (ref 32–37)
MCV RBC AUTO: 86.1 FL — SIGNIFICANT CHANGE UP (ref 80–94)
MCV RBC AUTO: 86.1 FL — SIGNIFICANT CHANGE UP (ref 80–94)
MONOCYTES # BLD AUTO: 0.27 K/UL — SIGNIFICANT CHANGE UP (ref 0.1–0.6)
MONOCYTES # BLD AUTO: 0.27 K/UL — SIGNIFICANT CHANGE UP (ref 0.1–0.6)
MONOCYTES NFR BLD AUTO: 5.5 % — SIGNIFICANT CHANGE UP (ref 1.7–9.3)
MONOCYTES NFR BLD AUTO: 5.5 % — SIGNIFICANT CHANGE UP (ref 1.7–9.3)
NEUTROPHILS # BLD AUTO: 3.15 K/UL — SIGNIFICANT CHANGE UP (ref 1.4–6.5)
NEUTROPHILS # BLD AUTO: 3.15 K/UL — SIGNIFICANT CHANGE UP (ref 1.4–6.5)
NEUTROPHILS NFR BLD AUTO: 64.5 % — SIGNIFICANT CHANGE UP (ref 42.2–75.2)
NEUTROPHILS NFR BLD AUTO: 64.5 % — SIGNIFICANT CHANGE UP (ref 42.2–75.2)
NRBC # BLD: 0 /100 WBCS — SIGNIFICANT CHANGE UP (ref 0–0)
NRBC # BLD: 0 /100 WBCS — SIGNIFICANT CHANGE UP (ref 0–0)
PLATELET # BLD AUTO: 191 K/UL — SIGNIFICANT CHANGE UP (ref 130–400)
PLATELET # BLD AUTO: 191 K/UL — SIGNIFICANT CHANGE UP (ref 130–400)
PMV BLD: 9.6 FL — SIGNIFICANT CHANGE UP (ref 7.4–10.4)
PMV BLD: 9.6 FL — SIGNIFICANT CHANGE UP (ref 7.4–10.4)
POTASSIUM SERPL-MCNC: 4.3 MMOL/L — SIGNIFICANT CHANGE UP (ref 3.5–5)
POTASSIUM SERPL-MCNC: 4.3 MMOL/L — SIGNIFICANT CHANGE UP (ref 3.5–5)
POTASSIUM SERPL-SCNC: 4.3 MMOL/L — SIGNIFICANT CHANGE UP (ref 3.5–5)
POTASSIUM SERPL-SCNC: 4.3 MMOL/L — SIGNIFICANT CHANGE UP (ref 3.5–5)
PROT SERPL-MCNC: 7.1 G/DL — SIGNIFICANT CHANGE UP (ref 6–8)
PROT SERPL-MCNC: 7.1 G/DL — SIGNIFICANT CHANGE UP (ref 6–8)
RBC # BLD: 5.32 M/UL — SIGNIFICANT CHANGE UP (ref 4.7–6.1)
RBC # BLD: 5.32 M/UL — SIGNIFICANT CHANGE UP (ref 4.7–6.1)
RBC # FLD: 11.8 % — SIGNIFICANT CHANGE UP (ref 11.5–14.5)
RBC # FLD: 11.8 % — SIGNIFICANT CHANGE UP (ref 11.5–14.5)
SODIUM SERPL-SCNC: 138 MMOL/L — SIGNIFICANT CHANGE UP (ref 135–146)
SODIUM SERPL-SCNC: 138 MMOL/L — SIGNIFICANT CHANGE UP (ref 135–146)
WBC # BLD: 4.89 K/UL — SIGNIFICANT CHANGE UP (ref 4.8–10.8)
WBC # BLD: 4.89 K/UL — SIGNIFICANT CHANGE UP (ref 4.8–10.8)
WBC # FLD AUTO: 4.89 K/UL — SIGNIFICANT CHANGE UP (ref 4.8–10.8)
WBC # FLD AUTO: 4.89 K/UL — SIGNIFICANT CHANGE UP (ref 4.8–10.8)

## 2023-11-13 PROCEDURE — 85025 COMPLETE CBC W/AUTO DIFF WBC: CPT

## 2023-11-13 PROCEDURE — 70450 CT HEAD/BRAIN W/O DYE: CPT | Mod: MA

## 2023-11-13 PROCEDURE — 70498 CT ANGIOGRAPHY NECK: CPT | Mod: MA

## 2023-11-13 PROCEDURE — 70450 CT HEAD/BRAIN W/O DYE: CPT | Mod: 26,59,MA

## 2023-11-13 PROCEDURE — 70496 CT ANGIOGRAPHY HEAD: CPT | Mod: 26,MA

## 2023-11-13 PROCEDURE — 70496 CT ANGIOGRAPHY HEAD: CPT | Mod: MA

## 2023-11-13 PROCEDURE — 99284 EMERGENCY DEPT VISIT MOD MDM: CPT | Mod: 25

## 2023-11-13 PROCEDURE — 80053 COMPREHEN METABOLIC PANEL: CPT

## 2023-11-13 PROCEDURE — 70498 CT ANGIOGRAPHY NECK: CPT | Mod: 26,MA

## 2023-11-13 PROCEDURE — 36415 COLL VENOUS BLD VENIPUNCTURE: CPT

## 2023-11-13 PROCEDURE — 99285 EMERGENCY DEPT VISIT HI MDM: CPT

## 2023-11-13 RX ORDER — ACETAMINOPHEN 500 MG
650 TABLET ORAL ONCE
Refills: 0 | Status: COMPLETED | OUTPATIENT
Start: 2023-11-13 | End: 2023-11-13

## 2023-11-13 RX ORDER — SODIUM CHLORIDE 9 MG/ML
1000 INJECTION, SOLUTION INTRAVENOUS ONCE
Refills: 0 | Status: COMPLETED | OUTPATIENT
Start: 2023-11-13 | End: 2023-11-13

## 2023-11-13 RX ADMIN — Medication 650 MILLIGRAM(S): at 16:14

## 2023-11-13 RX ADMIN — SODIUM CHLORIDE 1000 MILLILITER(S): 9 INJECTION, SOLUTION INTRAVENOUS at 15:52

## 2023-11-13 NOTE — ED PROVIDER NOTE - OBJECTIVE STATEMENT
23-year-old male with a history of migraines and concussions presents with headache dizziness x3 days.  Patient admits that he has had football game.  He is a  and was screaming very loudly and felt sudden onset right occipital head pain associated with dizziness nausea and vision change.  Assessed by EMS at that time and symptoms resolved shortly.  Over the last 3 days has been having intermittent dizziness, headache, photophobia.  Took Motrin yesterday with minimal relief.  Last migraine was over 1 year ago  Denies [facility] vascular issues 23-year-old male with a history of migraines and concussions presents with headache dizziness x3 days.  Patient admits that he screaming very loudly at a football game 3 days ago and felt sudden onset right occipital head pain associated with dizziness nausea and vision change.  Assessed by EMS at that time and symptoms resolved shortly.  Over the last 3 days has been having intermittent dizziness, headache, photophobia.  Took Motrin yesterday with minimal relief.  Last migraine was over 1 year ago  Denies [facility] vascular issues

## 2023-11-13 NOTE — ED ADULT NURSE NOTE - NS ED NOTE  TALK SOMEONE YN
No As per previous records and Psyckes, depakote 1000mg BID, Duloxetine 60mg BID, Dicyclomine HCL 20mg qhs, atenolol 50mg qam, methocarbamol 1250mg qhs, amantadine 100mg BID, tamsulosin 0.4mg qhs, naproxen 500mg BID.

## 2023-11-13 NOTE — ED ADULT NURSE NOTE - NSFALLUNIVINTERV_ED_ALL_ED
Bed/Stretcher in lowest position, wheels locked, appropriate side rails in place/Call bell, personal items and telephone in reach/Instruct patient to call for assistance before getting out of bed/chair/stretcher/Non-slip footwear applied when patient is off stretcher/[place] to call system/Physically safe environment - no spills, clutter or unnecessary equipment/Purposeful proactive rounding/Room/bathroom lighting operational, light cord in reach

## 2023-11-13 NOTE — ED PROVIDER NOTE - PATIENT PORTAL LINK FT
You can access the FollowMyHealth Patient Portal offered by [facility] by registering at the following website: [email or web address]. By joining [employer]’s FollowMyHealth portal, you will also be able to view your health information using other applications (apps) compatible with our system.

## 2023-11-13 NOTE — ED PROVIDER NOTE - PHYSICAL EXAMINATION
CONSTITUTIONAL: NAD  SKIN: Warm dry  HEAD: NCAT  EYES: NL inspection  ENT: MMM  NECK: Supple; non tender.  CARD: RRR  RESP: CTAB  ABD: S/NT no R/G  EXT: no pedal edema  NEURO: Grossly unremarkablem cn 2-12 grossly intact, +dizzy upon walking, normal gait   PSYCH: Cooperative, appropriate.

## 2023-11-13 NOTE — ED PROVIDER NOTE - CLINICAL SUMMARY MEDICAL DECISION MAKING FREE TEXT BOX
23M h/o migraines and concussions presents with headache dizziness x3 days. Onset after screaming. Denies trauma. Admits to dizziness, HA, photophobia since.     VS normal.     Neuro exam unremarkable.    Labs reassuring. CTH and CTA H+N neg for acute pathology. No indication for further testing in the ER. Rec OP f/u, return for worsening s/s.

## 2023-11-13 NOTE — ED PROVIDER NOTE - TEST CONSIDERED BUT NOT PERFORMED
MR however with negative CT imaging, yield is low for pt of this age. Rec OP neuro f/u, return for worsening sxs Tests Considered But Not Performed

## 2023-11-13 NOTE — ED ADULT TRIAGE NOTE - CHIEF COMPLAINT QUOTE
pt was at a game this weekend and was screaming and started feeling a pop in the back of his head. pt has developed a headache since then. pt having nauseas vomiting dizziness since yesterday and light sensitivity. pt w hx of concussion X 3.

## 2023-11-15 ENCOUNTER — APPOINTMENT (OUTPATIENT)
Dept: NEUROLOGY | Facility: CLINIC | Age: 23
End: 2023-11-15
Payer: COMMERCIAL

## 2023-11-15 VITALS — WEIGHT: 235 LBS | BODY MASS INDEX: 31.14 KG/M2 | HEIGHT: 73 IN

## 2023-11-15 VITALS — HEART RATE: 65 BPM | DIASTOLIC BLOOD PRESSURE: 71 MMHG | SYSTOLIC BLOOD PRESSURE: 127 MMHG

## 2023-11-15 DIAGNOSIS — Z82.49 FAMILY HISTORY OF ISCHEMIC HEART DISEASE AND OTHER DISEASES OF THE CIRCULATORY SYSTEM: ICD-10-CM

## 2023-11-15 DIAGNOSIS — Z81.1 FAMILY HISTORY OF ALCOHOL ABUSE AND DEPENDENCE: ICD-10-CM

## 2023-11-15 DIAGNOSIS — Z80.9 FAMILY HISTORY OF MALIGNANT NEOPLASM, UNSPECIFIED: ICD-10-CM

## 2023-11-15 DIAGNOSIS — Z85.828 PERSONAL HISTORY OF OTHER MALIGNANT NEOPLASM OF SKIN: ICD-10-CM

## 2023-11-15 DIAGNOSIS — Z83.3 FAMILY HISTORY OF DIABETES MELLITUS: ICD-10-CM

## 2023-11-15 DIAGNOSIS — Z78.9 OTHER SPECIFIED HEALTH STATUS: ICD-10-CM

## 2023-11-15 DIAGNOSIS — R29.1: ICD-10-CM

## 2023-11-15 PROCEDURE — 99204 OFFICE O/P NEW MOD 45 MIN: CPT

## 2023-12-05 ENCOUNTER — RESULT REVIEW (OUTPATIENT)
Age: 23
End: 2023-12-05

## 2023-12-05 ENCOUNTER — OUTPATIENT (OUTPATIENT)
Dept: OUTPATIENT SERVICES | Facility: HOSPITAL | Age: 23
LOS: 1 days | End: 2023-12-05
Payer: COMMERCIAL

## 2023-12-05 DIAGNOSIS — R29.1 MENINGISMUS: ICD-10-CM

## 2023-12-05 DIAGNOSIS — Z00.8 ENCOUNTER FOR OTHER GENERAL EXAMINATION: ICD-10-CM

## 2023-12-05 DIAGNOSIS — R51.9 HEADACHE, UNSPECIFIED: ICD-10-CM

## 2023-12-05 PROCEDURE — 70551 MRI BRAIN STEM W/O DYE: CPT

## 2023-12-05 PROCEDURE — 72141 MRI NECK SPINE W/O DYE: CPT

## 2023-12-05 PROCEDURE — 72141 MRI NECK SPINE W/O DYE: CPT | Mod: 26

## 2023-12-05 PROCEDURE — 70551 MRI BRAIN STEM W/O DYE: CPT | Mod: 26

## 2023-12-06 DIAGNOSIS — R29.1 MENINGISMUS: ICD-10-CM

## 2023-12-06 DIAGNOSIS — R51.9 HEADACHE, UNSPECIFIED: ICD-10-CM

## 2023-12-13 ENCOUNTER — APPOINTMENT (OUTPATIENT)
Dept: NEUROLOGY | Facility: CLINIC | Age: 23
End: 2023-12-13
Payer: COMMERCIAL

## 2023-12-13 VITALS — SYSTOLIC BLOOD PRESSURE: 150 MMHG | DIASTOLIC BLOOD PRESSURE: 78 MMHG | HEART RATE: 68 BPM

## 2023-12-13 VITALS — HEIGHT: 73 IN | WEIGHT: 235 LBS | BODY MASS INDEX: 31.14 KG/M2

## 2023-12-13 DIAGNOSIS — R51.9 HEADACHE, UNSPECIFIED: ICD-10-CM

## 2023-12-13 PROCEDURE — 99213 OFFICE O/P EST LOW 20 MIN: CPT

## 2023-12-13 NOTE — ASSESSMENT
[FreeTextEntry1] : 23 year old male with headaches and neck pain that have subsided.  We will  clear him to return to work without restriction and f/u as needed.  He is aware if there are any issues he will contact the office.  Candida Rick MS, PA-C John Dumont MD, Supervising Physician oral

## 2023-12-13 NOTE — REVIEW OF SYSTEMS
[Tension Headache] : tension-type headaches [Arthralgias] : arthralgias [Joint Stiffness] : joint stiffness

## 2023-12-13 NOTE — HISTORY OF PRESENT ILLNESS
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 Patient became excited and was yelling when he felt a pop in the back of his head followed by headache, nausea, light sensitivity, and dizziness. He called EMT and was taken to [facility]. At that time, he had photosensitivity and a stiff neck. Patient had CT scan of the brain and CTA of the brain and neck which were all negative for hemorrhage. He has continued to have headaches, nausea, and neck stiffness.   Patient did have a concussion in 2014 when he saw Dr. Ng. At that time, he had a [id number] impact at practice. There was no loss of consciousness. An MRI of the brain at that time was normal except for right maxillary sinus opacification. BSEP was normal. EEG was normal as well.   TODAY:  I had the pleasure of seeing Mr. Martino today in follow up.  His previous history and physical findings have been reviewed.   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  He underwent MRI brain and cervical spine for further evaluation of his complaints which was reviewed today and was normal.  He states he feels back to normal and that this episode has not occurred since.  He states that he has experienced similar episodes in the past and even saw a cardiologist who stated everything was normal.  I advised that he keep his stress level down and not get as excited, elevating his BP to high levels which he verbalizes his agreement.  We will hold off with respect to any further treatment or testing at this time.

## 2023-12-13 NOTE — REASON FOR VISIT
[Follow-Up: _____] : a [unfilled] follow-up visit [Initial Evaluation] : an initial evaluation [FreeTextEntry1] : headaches

## 2024-10-11 ENCOUNTER — EMERGENCY (EMERGENCY)
Facility: HOSPITAL | Age: 24
LOS: 0 days | Discharge: HOME | End: 2024-10-11
Attending: EMERGENCY MEDICINE
Payer: COMMERCIAL

## 2024-10-11 VITALS
OXYGEN SATURATION: 100 % | DIASTOLIC BLOOD PRESSURE: 99 MMHG | TEMPERATURE: 97 F | RESPIRATION RATE: 18 BRPM | HEART RATE: 84 BPM | SYSTOLIC BLOOD PRESSURE: 149 MMHG | WEIGHT: 250 LBS | HEIGHT: 72 IN

## 2024-10-11 PROCEDURE — 99284 EMERGENCY DEPT VISIT MOD MDM: CPT | Mod: 25

## 2024-10-11 PROCEDURE — 76870 US EXAM SCROTUM: CPT

## 2024-10-11 PROCEDURE — 76870 US EXAM SCROTUM: CPT | Mod: 26

## 2024-10-11 PROCEDURE — 99284 EMERGENCY DEPT VISIT MOD MDM: CPT

## 2024-10-11 RX ORDER — ACETAMINOPHEN 325 MG
650 TABLET ORAL ONCE
Refills: 0 | Status: COMPLETED | OUTPATIENT
Start: 2024-10-11 | End: 2024-10-11

## 2024-10-11 RX ADMIN — Medication 650 MILLIGRAM(S): at 19:34

## 2024-10-11 NOTE — ED PROVIDER NOTE - PHYSICAL EXAMINATION
VITAL SIGNS: I have reviewed nursing notes and confirm.  CONSTITUTIONAL: Well-developed; well-nourished; in no acute distress.  SKIN: Skin exam is warm and dry, no acute rash.  HEAD: Normocephalic; atraumatic.  EYES: PERRL, EOM intact; conjunctiva and sclera clear.  ENT: airway clear. TMs clear.  NECK: Supple  CARD: S1, S2 normal; no murmurs, gallops, or rubs. Regular rate and rhythm.  RESP: No wheezes, rales or rhonchi.  ABD: Normal bowel sounds; soft; non-distended; non-tender  : Bilateral descended testicles.  Bilateral scrotum without erythema or swelling.  Mild tenderness to the patient's right testicle.  Good cremasteric reflexes bilaterally.  EXT: Normal ROM.   NEURO: Alert, oriented.   PSYCH: Cooperative, appropriate.

## 2024-10-11 NOTE — ED ADULT TRIAGE NOTE - CHIEF COMPLAINT QUOTE
right testicular pain since today. right testicular keep descending in especially when having a  bowel movement..   pt states he had the same episode 1 week ago but went back down on its own  pt unable to sit  in triage

## 2024-10-11 NOTE — ED PROVIDER NOTE - OBJECTIVE STATEMENT
24-year-old male with history of left-sided hydrocele presents ED with complaints of right testicular pain.  Patient states that it has been happening on and off for the past week or so, states that he has been feeling it every time he uses the bathroom for a bowel movement.  Patient still feels like he testicle is riding upwards.  Happened again today, prompting visit ED for evaluation.  In ED, patient states he is not having real pain, but just feels uncomfortable.  Worse when he sitting down.  No fevers, no nausea or vomiting.

## 2024-10-11 NOTE — ED ADULT NURSE NOTE - NSFALLOOBATTEMPT_ED_ALL_ED
Detail Level: Detailed Duration Of Freeze Thaw-Cycle (Seconds): 5 Post-Care Instructions: I reviewed with the patient in detail post-care instructions. Patient is to wear sunprotection, and avoid picking at any of the treated lesions. Pt may apply Vaseline to crusted or scabbing areas. Consent: The patient's consent was obtained including but not limited to risks of crusting, scabbing, blistering, scarring, darker or lighter pigmentary change, recurrence, incomplete removal and infection. Render Post-Care Instructions In Note?: yes No

## 2024-10-11 NOTE — ED ADULT NURSE NOTE - NSFALLUNIVINTERV_ED_ALL_ED
Bed/Stretcher in lowest position, wheels locked, appropriate side rails in place/Call bell, personal items and telephone in reach/Instruct patient to call for assistance before getting out of bed/chair/stretcher/Non-slip footwear applied when patient is off stretcher/[place] to call system/Physically safe environment - no spills, clutter or unnecessary equipment/Purposeful proactive rounding/Room/bathroom lighting operational, light cord in reach

## 2024-10-11 NOTE — ED PROVIDER NOTE - PATIENT PORTAL LINK FT
You can access the FollowMyHealth Patient Portal offered by [facility] by registering at the following website: [email or web address]. By joining [employer]’s FollowMyHealth portal, you will also be able to view your health information using other applications (apps) compatible with our system.

## 2024-10-11 NOTE — ED PROVIDER NOTE - NSFOLLOWUPINSTRUCTIONS_ED_ALL_ED_FT
Our Emergency Department Referral Coordinators will be reaching out to you in the next 24-48 hours from 9:00am to 5:00pm with a follow up appointment. Please expect a phone call from the hospital in that time frame. If you do not receive a call or if you have any questions or concerns, you can reach them at   [phone]     Testicle Pain    WHAT YOU NEED TO KNOW:    Testicle pain may start in your scrotum and spread to your abdomen. You may have sharp, sudden pain or dull pain that happens over time. Your testicle pain may come and go, or it may last for a long time. The cause of your pain may be unknown. Testicle pain can be caused by infection, trauma, hernia, kidney stones, or sexually transmitted infections (STIs). You may have a painful lump in your scrotum. The lump may be caused by an enlarged vein or fluid that collects around one of your testicles. This lump also may be caused by a more serious medical condition. Part of your testicle may twist. This is a serious condition that needs treatment as soon as possible.    DISCHARGE INSTRUCTIONS:    Medicines:     Antibiotics: This medicine helps fight or prevent infection. Take your antibiotics until they are gone, even if you feel better.      Pain medicine: You may be given a prescription medicine to decrease pain. Do not wait until the pain is severe before you take this medicine.      NSAIDs: These medicines decrease swelling, pain, and fever. NSAIDs are available without a doctor's order. Ask your healthcare provider which medicine is right for you. Ask how much to take and when to take it. Take as directed. NSAIDs can cause stomach bleeding and kidney problems if not taken correctly.      Take your medicine as directed. Contact your healthcare provider if you think your medicine is not helping or if you have side effects. Tell him or her if you are allergic to any medicine. Keep a list of the medicines, vitamins, and herbs you take. Include the amounts, and when and why you take them. Bring the list or the pill bottles to follow-up visits. Carry your medicine list with you in case of an emergency.    Decrease discomfort: With treatment, your pain may improve within 1 to 3 days. Depending on the cause of your testicle pain, your condition may take up to 4 weeks to heal.     Rest: Limit your activity until your pain decreases. Get more rest while you heal. Do not sit for long periods of time.       Cold packs: Place cold packs on your testicles to help ease your pain. Use cold packs as directed.      Elevation: Gently tuck a folded towel under your testicles to lift them as you sit in a chair or lie in bed. This will help ease your pain and decrease swelling.    Follow up with your healthcare provider or urologist in 3 to 7 days: Write down your questions so you remember to ask them during your visits.    Sexual activity: Avoid sexual activity until you have finished your antibiotics or until your healthcare provider tells you it is safe to have sex. Use condoms to lower your risk of STIs.    Contact your healthcare provider or urologist if:     You feel that your medicine or treatment is not working.      You feel more pain, tenderness, or swelling than before.      You have nausea or a low fever.      You have questions or concerns about your condition or care.    Return to the emergency department if:     You have sudden or severe pain in your testicles or abdomen.      You have pain in both testicles.      You are vomiting.      You have a high fever.      Your pain increases when you elevate your testicles.      Your scrotum turns blue. This could mean your testicle is not getting the blood flow it needs.

## 2024-10-18 NOTE — CHART NOTE - NSCHARTNOTEFT_GEN_A_CORE
Appt scheduled 10/28/2024 01:20 PM w/ Dr. Richardson @ [address] [facility] (urology referral).

## 2024-10-28 ENCOUNTER — NON-APPOINTMENT (OUTPATIENT)
Age: 24
End: 2024-10-28

## 2024-10-28 ENCOUNTER — APPOINTMENT (OUTPATIENT)
Dept: UROLOGY | Facility: CLINIC | Age: 24
End: 2024-10-28
Payer: COMMERCIAL

## 2024-10-28 VITALS
SYSTOLIC BLOOD PRESSURE: 147 MMHG | HEIGHT: 72 IN | RESPIRATION RATE: 18 BRPM | DIASTOLIC BLOOD PRESSURE: 88 MMHG | WEIGHT: 252 LBS | OXYGEN SATURATION: 99 % | BODY MASS INDEX: 34.13 KG/M2 | HEART RATE: 76 BPM

## 2024-10-28 DIAGNOSIS — N50.819 TESTICULAR PAIN, UNSPECIFIED: ICD-10-CM

## 2024-10-28 DIAGNOSIS — Z09 ENCOUNTER FOR FOLLOW-UP EXAMINATION AFTER COMPLETED TREATMENT FOR CONDITIONS OTHER THAN MALIGNANT NEOPLASM: ICD-10-CM

## 2024-10-28 PROCEDURE — 99203 OFFICE O/P NEW LOW 30 MIN: CPT

## 2024-11-01 PROBLEM — Z09 HOSPITAL DISCHARGE FOLLOW-UP: Status: ACTIVE | Noted: 2024-10-28
